# Patient Record
Sex: FEMALE | Race: WHITE | Employment: OTHER | URBAN - METROPOLITAN AREA
[De-identification: names, ages, dates, MRNs, and addresses within clinical notes are randomized per-mention and may not be internally consistent; named-entity substitution may affect disease eponyms.]

---

## 2020-07-30 DIAGNOSIS — Z11.59 ENCOUNTER FOR SCREENING FOR OTHER VIRAL DISEASES: Primary | ICD-10-CM

## 2020-08-12 ENCOUNTER — DOCUMENTATION ONLY (OUTPATIENT)
Dept: EDUCATION SERVICES | Facility: CLINIC | Age: 70
End: 2020-08-12

## 2020-08-13 ENCOUNTER — TRANSFERRED RECORDS (OUTPATIENT)
Dept: HEALTH INFORMATION MANAGEMENT | Facility: CLINIC | Age: 70
End: 2020-08-13

## 2020-08-13 LAB
CREAT SERPL-MCNC: 0.93 MG/DL (ref 0.5–1)
GFR SERPL CREATININE-BSD FRML MDRD: 63 ML/MIN/1.73M2
GLUCOSE SERPL-MCNC: 113 MG/DL (ref 70–100)
POTASSIUM SERPL-SCNC: 4.7 MEQ/L (ref 3.5–5.3)

## 2020-08-27 DIAGNOSIS — Z11.59 ENCOUNTER FOR SCREENING FOR OTHER VIRAL DISEASES: ICD-10-CM

## 2020-08-27 PROCEDURE — U0003 INFECTIOUS AGENT DETECTION BY NUCLEIC ACID (DNA OR RNA); SEVERE ACUTE RESPIRATORY SYNDROME CORONAVIRUS 2 (SARS-COV-2) (CORONAVIRUS DISEASE [COVID-19]), AMPLIFIED PROBE TECHNIQUE, MAKING USE OF HIGH THROUGHPUT TECHNOLOGIES AS DESCRIBED BY CMS-2020-01-R: HCPCS | Performed by: ORTHOPAEDIC SURGERY

## 2020-08-28 LAB
SARS-COV-2 RNA SPEC QL NAA+PROBE: NOT DETECTED
SPECIMEN SOURCE: NORMAL

## 2020-08-28 RX ORDER — ATENOLOL 25 MG/1
12.5-25 TABLET ORAL DAILY PRN
Status: ON HOLD | COMMUNITY
End: 2020-09-04

## 2020-08-28 RX ORDER — MULTIVITAMIN,THERAPEUTIC
1 TABLET ORAL EVERY MORNING
COMMUNITY
End: 2020-08-31

## 2020-08-28 RX ORDER — ASPIRIN 81 MG/1
81 TABLET ORAL
Status: ON HOLD | COMMUNITY
End: 2020-09-01

## 2020-08-28 RX ORDER — DIVALPROEX SODIUM 250 MG/1
250-500 TABLET, EXTENDED RELEASE ORAL
COMMUNITY

## 2020-08-28 RX ORDER — ATORVASTATIN CALCIUM 10 MG/1
10 TABLET, FILM COATED ORAL AT BEDTIME
COMMUNITY

## 2020-08-28 RX ORDER — ACETAMINOPHEN 500 MG
1000 TABLET ORAL 3 TIMES DAILY
COMMUNITY

## 2020-08-30 ENCOUNTER — ANESTHESIA EVENT (OUTPATIENT)
Dept: SURGERY | Facility: CLINIC | Age: 70
DRG: 469 | End: 2020-08-30
Payer: MEDICARE

## 2020-08-31 ENCOUNTER — ANESTHESIA (OUTPATIENT)
Dept: SURGERY | Facility: CLINIC | Age: 70
DRG: 469 | End: 2020-08-31
Payer: MEDICARE

## 2020-08-31 ENCOUNTER — APPOINTMENT (OUTPATIENT)
Dept: GENERAL RADIOLOGY | Facility: CLINIC | Age: 70
DRG: 469 | End: 2020-08-31
Attending: ORTHOPAEDIC SURGERY
Payer: MEDICARE

## 2020-08-31 ENCOUNTER — HOSPITAL ENCOUNTER (INPATIENT)
Facility: CLINIC | Age: 70
LOS: 4 days | Discharge: HOME | DRG: 469 | End: 2020-09-04
Attending: ORTHOPAEDIC SURGERY | Admitting: ORTHOPAEDIC SURGERY
Payer: MEDICARE

## 2020-08-31 DIAGNOSIS — Z96.642 STATUS POST TOTAL HIP REPLACEMENT, LEFT: ICD-10-CM

## 2020-08-31 DIAGNOSIS — Z96.642 STATUS POST TOTAL REPLACEMENT OF LEFT HIP: Primary | ICD-10-CM

## 2020-08-31 LAB
ABO + RH BLD: NORMAL
ABO + RH BLD: NORMAL
ANION GAP SERPL CALCULATED.3IONS-SCNC: 5 MMOL/L (ref 3–14)
ANION GAP SERPL CALCULATED.3IONS-SCNC: 5 MMOL/L (ref 3–14)
APTT PPP: 29 SEC (ref 22–37)
APTT PPP: 48 SEC (ref 22–37)
BLD GP AB SCN SERPL QL: NORMAL
BLD PROD TYP BPU: NORMAL
BLD UNIT ID BPU: 0
BLD UNIT ID BPU: 0
BLOOD BANK CMNT PATIENT-IMP: NORMAL
BLOOD PRODUCT CODE: NORMAL
BLOOD PRODUCT CODE: NORMAL
BPU ID: NORMAL
BPU ID: NORMAL
BUN SERPL-MCNC: 15 MG/DL (ref 7–30)
BUN SERPL-MCNC: 15 MG/DL (ref 7–30)
CALCIUM SERPL-MCNC: 7.1 MG/DL (ref 8.5–10.1)
CALCIUM SERPL-MCNC: 7.1 MG/DL (ref 8.5–10.1)
CHLORIDE SERPL-SCNC: 113 MMOL/L (ref 94–109)
CHLORIDE SERPL-SCNC: 113 MMOL/L (ref 94–109)
CO2 BLD-SCNC: 21 MMOL/L (ref 21–28)
CO2 BLD-SCNC: 24 MMOL/L (ref 21–28)
CO2 BLD-SCNC: 25 MMOL/L (ref 21–28)
CO2 SERPL-SCNC: 25 MMOL/L (ref 20–32)
CO2 SERPL-SCNC: 25 MMOL/L (ref 20–32)
CREAT SERPL-MCNC: 0.66 MG/DL (ref 0.52–1.04)
CREAT SERPL-MCNC: 0.71 MG/DL (ref 0.52–1.04)
ERYTHROCYTE [DISTWIDTH] IN BLOOD BY AUTOMATED COUNT: 12.4 % (ref 10–15)
GFR SERPL CREATININE-BSD FRML MDRD: 86 ML/MIN/{1.73_M2}
GFR SERPL CREATININE-BSD FRML MDRD: 90 ML/MIN/{1.73_M2}
GLUCOSE SERPL-MCNC: 191 MG/DL (ref 70–99)
GLUCOSE SERPL-MCNC: 201 MG/DL (ref 70–99)
HCT VFR BLD AUTO: 19.1 % (ref 35–47)
HCT VFR BLD AUTO: 30.3 % (ref 35–47)
HCT VFR BLD CALC: 25 %PCV (ref 35–47)
HCT VFR BLD CALC: 26 %PCV (ref 35–47)
HCT VFR BLD CALC: 27 %PCV (ref 35–47)
HGB BLD CALC-MCNC: 8.5 G/DL (ref 11.7–15.7)
HGB BLD CALC-MCNC: 8.8 G/DL (ref 11.7–15.7)
HGB BLD CALC-MCNC: 9.2 G/DL (ref 11.7–15.7)
HGB BLD-MCNC: 10.5 G/DL (ref 11.7–15.7)
HGB BLD-MCNC: 6.5 G/DL (ref 11.7–15.7)
INR PPP: 1.33 (ref 0.86–1.14)
MCH RBC QN AUTO: 31.6 PG (ref 26.5–33)
MCHC RBC AUTO-ENTMCNC: 34.7 G/DL (ref 31.5–36.5)
MCV RBC AUTO: 91 FL (ref 78–100)
NUM BPU REQUESTED: 4
PCO2 BLD: 32 MM HG (ref 35–45)
PCO2 BLD: 41 MM HG (ref 35–45)
PCO2 BLD: 44 MM HG (ref 35–45)
PH BLD: 7.35 PH (ref 7.35–7.45)
PH BLD: 7.38 PH (ref 7.35–7.45)
PH BLD: 7.43 PH (ref 7.35–7.45)
PLATELET # BLD AUTO: 150 10E9/L (ref 150–450)
PLATELET # BLD AUTO: 209 10E9/L (ref 150–450)
PO2 BLD: 231 MM HG (ref 80–105)
PO2 BLD: 369 MM HG (ref 80–105)
PO2 BLD: 436 MM HG (ref 80–105)
POTASSIUM BLD-SCNC: 2.9 MMOL/L (ref 3.4–5.3)
POTASSIUM BLD-SCNC: 3.6 MMOL/L (ref 3.4–5.3)
POTASSIUM BLD-SCNC: 3.8 MMOL/L (ref 3.4–5.3)
POTASSIUM SERPL-SCNC: 3.5 MMOL/L (ref 3.4–5.3)
POTASSIUM SERPL-SCNC: 3.6 MMOL/L (ref 3.4–5.3)
RBC # BLD AUTO: 3.32 10E12/L (ref 3.8–5.2)
SAO2 % BLDA FROM PO2: 100 % (ref 92–100)
SODIUM BLD-SCNC: 141 MMOL/L (ref 133–144)
SODIUM BLD-SCNC: 142 MMOL/L (ref 133–144)
SODIUM BLD-SCNC: 142 MMOL/L (ref 133–144)
SODIUM SERPL-SCNC: 143 MMOL/L (ref 133–144)
SODIUM SERPL-SCNC: 143 MMOL/L (ref 133–144)
SPECIMEN EXP DATE BLD: NORMAL
TRANSFUSION STATUS PATIENT QL: NORMAL
TROPONIN I SERPL-MCNC: 0.08 UG/L (ref 0–0.04)
WBC # BLD AUTO: 16.6 10E9/L (ref 4–11)

## 2020-08-31 PROCEDURE — 25000125 ZZHC RX 250: Performed by: ANESTHESIOLOGY

## 2020-08-31 PROCEDURE — 85014 HEMATOCRIT: CPT

## 2020-08-31 PROCEDURE — 99205 OFFICE O/P NEW HI 60 MIN: CPT | Performed by: PHYSICIAN ASSISTANT

## 2020-08-31 PROCEDURE — 84132 ASSAY OF SERUM POTASSIUM: CPT

## 2020-08-31 PROCEDURE — 25000125 ZZHC RX 250: Performed by: NURSE ANESTHETIST, CERTIFIED REGISTERED

## 2020-08-31 PROCEDURE — 86900 BLOOD TYPING SEROLOGIC ABO: CPT | Performed by: ANESTHESIOLOGY

## 2020-08-31 PROCEDURE — 36000065 ZZH SURGERY LEVEL 4 W FLUORO 1ST 30 MIN: Performed by: ORTHOPAEDIC SURGERY

## 2020-08-31 PROCEDURE — 25800025 ZZH RX 258: Performed by: ORTHOPAEDIC SURGERY

## 2020-08-31 PROCEDURE — 25800030 ZZH RX IP 258 OP 636: Performed by: ANESTHESIOLOGY

## 2020-08-31 PROCEDURE — 40000985 XR PELVIS AND HIP LEFT 1 VIEW

## 2020-08-31 PROCEDURE — 86850 RBC ANTIBODY SCREEN: CPT | Performed by: ANESTHESIOLOGY

## 2020-08-31 PROCEDURE — 85027 COMPLETE CBC AUTOMATED: CPT | Performed by: ANESTHESIOLOGY

## 2020-08-31 PROCEDURE — 99207 ZZC CDG-CODE CATEGORY CHANGED: CPT | Performed by: PHYSICIAN ASSISTANT

## 2020-08-31 PROCEDURE — 86901 BLOOD TYPING SEROLOGIC RH(D): CPT | Performed by: ANESTHESIOLOGY

## 2020-08-31 PROCEDURE — 25000132 ZZH RX MED GY IP 250 OP 250 PS 637: Mod: GY | Performed by: ORTHOPAEDIC SURGERY

## 2020-08-31 PROCEDURE — 40000170 ZZH STATISTIC PRE-PROCEDURE ASSESSMENT II: Performed by: ORTHOPAEDIC SURGERY

## 2020-08-31 PROCEDURE — 84484 ASSAY OF TROPONIN QUANT: CPT | Performed by: ANESTHESIOLOGY

## 2020-08-31 PROCEDURE — 85730 THROMBOPLASTIN TIME PARTIAL: CPT | Performed by: ANESTHESIOLOGY

## 2020-08-31 PROCEDURE — 84295 ASSAY OF SERUM SODIUM: CPT

## 2020-08-31 PROCEDURE — 36415 COLL VENOUS BLD VENIPUNCTURE: CPT | Performed by: ANESTHESIOLOGY

## 2020-08-31 PROCEDURE — 25000128 H RX IP 250 OP 636: Performed by: NURSE ANESTHETIST, CERTIFIED REGISTERED

## 2020-08-31 PROCEDURE — 85730 THROMBOPLASTIN TIME PARTIAL: CPT | Performed by: ORTHOPAEDIC SURGERY

## 2020-08-31 PROCEDURE — P9041 ALBUMIN (HUMAN),5%, 50ML: HCPCS | Performed by: ANESTHESIOLOGY

## 2020-08-31 PROCEDURE — C1776 JOINT DEVICE (IMPLANTABLE): HCPCS | Performed by: ORTHOPAEDIC SURGERY

## 2020-08-31 PROCEDURE — 40000277 XR SURGERY CARM FLUORO LESS THAN 5 MIN W STILLS

## 2020-08-31 PROCEDURE — 80048 BASIC METABOLIC PNL TOTAL CA: CPT | Performed by: ANESTHESIOLOGY

## 2020-08-31 PROCEDURE — 25000128 H RX IP 250 OP 636: Performed by: ORTHOPAEDIC SURGERY

## 2020-08-31 PROCEDURE — 25000132 ZZH RX MED GY IP 250 OP 250 PS 637: Mod: GY | Performed by: PHYSICIAN ASSISTANT

## 2020-08-31 PROCEDURE — 82803 BLOOD GASES ANY COMBINATION: CPT

## 2020-08-31 PROCEDURE — 80048 BASIC METABOLIC PNL TOTAL CA: CPT | Performed by: ORTHOPAEDIC SURGERY

## 2020-08-31 PROCEDURE — 37000009 ZZH ANESTHESIA TECHNICAL FEE, EACH ADDTL 15 MIN: Performed by: ORTHOPAEDIC SURGERY

## 2020-08-31 PROCEDURE — 27210794 ZZH OR GENERAL SUPPLY STERILE: Performed by: ORTHOPAEDIC SURGERY

## 2020-08-31 PROCEDURE — 0SRB03Z REPLACEMENT OF LEFT HIP JOINT WITH CERAMIC SYNTHETIC SUBSTITUTE, OPEN APPROACH: ICD-10-PCS | Performed by: ORTHOPAEDIC SURGERY

## 2020-08-31 PROCEDURE — 25800030 ZZH RX IP 258 OP 636: Performed by: ORTHOPAEDIC SURGERY

## 2020-08-31 PROCEDURE — 25000125 ZZHC RX 250: Performed by: ORTHOPAEDIC SURGERY

## 2020-08-31 PROCEDURE — 25000566 ZZH SEVOFLURANE, EA 15 MIN: Performed by: ORTHOPAEDIC SURGERY

## 2020-08-31 PROCEDURE — 85610 PROTHROMBIN TIME: CPT | Performed by: ANESTHESIOLOGY

## 2020-08-31 PROCEDURE — 85027 COMPLETE CBC AUTOMATED: CPT | Performed by: ORTHOPAEDIC SURGERY

## 2020-08-31 PROCEDURE — 25000128 H RX IP 250 OP 636: Performed by: ANESTHESIOLOGY

## 2020-08-31 PROCEDURE — 37000008 ZZH ANESTHESIA TECHNICAL FEE, 1ST 30 MIN: Performed by: ORTHOPAEDIC SURGERY

## 2020-08-31 PROCEDURE — 25800030 ZZH RX IP 258 OP 636: Performed by: NURSE ANESTHETIST, CERTIFIED REGISTERED

## 2020-08-31 PROCEDURE — 93010 ELECTROCARDIOGRAM REPORT: CPT | Performed by: INTERNAL MEDICINE

## 2020-08-31 PROCEDURE — 71000013 ZZH RECOVERY PHASE 1 LEVEL 1 EA ADDTL HR: Performed by: ORTHOPAEDIC SURGERY

## 2020-08-31 PROCEDURE — 86923 COMPATIBILITY TEST ELECTRIC: CPT | Performed by: ANESTHESIOLOGY

## 2020-08-31 PROCEDURE — P9041 ALBUMIN (HUMAN),5%, 50ML: HCPCS | Performed by: NURSE ANESTHETIST, CERTIFIED REGISTERED

## 2020-08-31 PROCEDURE — 71000012 ZZH RECOVERY PHASE 1 LEVEL 1 FIRST HR: Performed by: ORTHOPAEDIC SURGERY

## 2020-08-31 PROCEDURE — P9016 RBC LEUKOCYTES REDUCED: HCPCS | Performed by: ANESTHESIOLOGY

## 2020-08-31 PROCEDURE — 36000063 ZZH SURGERY LEVEL 4 EA 15 ADDTL MIN: Performed by: ORTHOPAEDIC SURGERY

## 2020-08-31 PROCEDURE — 93005 ELECTROCARDIOGRAM TRACING: CPT

## 2020-08-31 DEVICE — IMPLANTABLE DEVICE
Type: IMPLANTABLE DEVICE | Site: HIP | Status: FUNCTIONAL
Brand: G7 ACETABULAR LINER

## 2020-08-31 DEVICE — IMPLANTABLE DEVICE
Type: IMPLANTABLE DEVICE | Site: HIP | Status: FUNCTIONAL
Brand: G7® ACETABULAR SYSTEM

## 2020-08-31 DEVICE — IMPLANTABLE DEVICE
Type: IMPLANTABLE DEVICE | Site: HIP | Status: FUNCTIONAL
Brand: KINECTIV®

## 2020-08-31 DEVICE — IMP HEAD FEM ZIM BIOLOX DELTA CER 32MM +0 00-8775-032-02: Type: IMPLANTABLE DEVICE | Site: HIP | Status: FUNCTIONAL

## 2020-08-31 RX ORDER — CEFAZOLIN SODIUM 1 G/3ML
1 INJECTION, POWDER, FOR SOLUTION INTRAMUSCULAR; INTRAVENOUS EVERY 8 HOURS
Status: COMPLETED | OUTPATIENT
Start: 2020-08-31 | End: 2020-09-01

## 2020-08-31 RX ORDER — SODIUM CHLORIDE, SODIUM LACTATE, POTASSIUM CHLORIDE, CALCIUM CHLORIDE 600; 310; 30; 20 MG/100ML; MG/100ML; MG/100ML; MG/100ML
INJECTION, SOLUTION INTRAVENOUS CONTINUOUS
Status: DISCONTINUED | OUTPATIENT
Start: 2020-08-31 | End: 2020-08-31 | Stop reason: HOSPADM

## 2020-08-31 RX ORDER — MEPERIDINE HYDROCHLORIDE 25 MG/ML
12.5 INJECTION INTRAMUSCULAR; INTRAVENOUS; SUBCUTANEOUS EVERY 5 MIN PRN
Status: DISCONTINUED | OUTPATIENT
Start: 2020-08-31 | End: 2020-08-31 | Stop reason: HOSPADM

## 2020-08-31 RX ORDER — TRANEXAMIC ACID 650 MG/1
1950 TABLET ORAL ONCE
Status: COMPLETED | OUTPATIENT
Start: 2020-08-31 | End: 2020-08-31

## 2020-08-31 RX ORDER — GLYCOPYRROLATE 0.2 MG/ML
INJECTION, SOLUTION INTRAMUSCULAR; INTRAVENOUS PRN
Status: DISCONTINUED | OUTPATIENT
Start: 2020-08-31 | End: 2020-08-31

## 2020-08-31 RX ORDER — ALBUMIN HUMAN 5 %
INTRAVENOUS SOLUTION INTRAVENOUS PRN
Status: DISCONTINUED | OUTPATIENT
Start: 2020-08-31 | End: 2020-08-31

## 2020-08-31 RX ORDER — CALCIUM CARBONATE 500 MG/1
1000 TABLET, CHEWABLE ORAL 4 TIMES DAILY PRN
Status: DISCONTINUED | OUTPATIENT
Start: 2020-08-31 | End: 2020-09-04 | Stop reason: HOSPADM

## 2020-08-31 RX ORDER — CEFAZOLIN SODIUM 1 G/3ML
1 INJECTION, POWDER, FOR SOLUTION INTRAMUSCULAR; INTRAVENOUS SEE ADMIN INSTRUCTIONS
Status: DISCONTINUED | OUTPATIENT
Start: 2020-08-31 | End: 2020-08-31 | Stop reason: HOSPADM

## 2020-08-31 RX ORDER — CALCIUM CARBONATE 500(1250)
500 TABLET ORAL EVERY MORNING
Status: DISCONTINUED | OUTPATIENT
Start: 2020-09-01 | End: 2020-09-04 | Stop reason: HOSPADM

## 2020-08-31 RX ORDER — OXYCODONE HYDROCHLORIDE 5 MG/1
5-10 TABLET ORAL
Status: DISCONTINUED | OUTPATIENT
Start: 2020-08-31 | End: 2020-09-04 | Stop reason: HOSPADM

## 2020-08-31 RX ORDER — CEFAZOLIN SODIUM 2 G/100ML
2 INJECTION, SOLUTION INTRAVENOUS
Status: COMPLETED | OUTPATIENT
Start: 2020-08-31 | End: 2020-08-31

## 2020-08-31 RX ORDER — ONDANSETRON 2 MG/ML
4 INJECTION INTRAMUSCULAR; INTRAVENOUS EVERY 6 HOURS PRN
Status: DISCONTINUED | OUTPATIENT
Start: 2020-08-31 | End: 2020-09-01

## 2020-08-31 RX ORDER — NALOXONE HYDROCHLORIDE 0.4 MG/ML
.1-.4 INJECTION, SOLUTION INTRAMUSCULAR; INTRAVENOUS; SUBCUTANEOUS
Status: DISCONTINUED | OUTPATIENT
Start: 2020-08-31 | End: 2020-08-31

## 2020-08-31 RX ORDER — ONDANSETRON 2 MG/ML
4 INJECTION INTRAMUSCULAR; INTRAVENOUS EVERY 30 MIN PRN
Status: DISCONTINUED | OUTPATIENT
Start: 2020-08-31 | End: 2020-08-31 | Stop reason: HOSPADM

## 2020-08-31 RX ORDER — ACETAMINOPHEN 325 MG/1
975 TABLET ORAL EVERY 8 HOURS
Status: DISCONTINUED | OUTPATIENT
Start: 2020-08-31 | End: 2020-09-04 | Stop reason: HOSPADM

## 2020-08-31 RX ORDER — HYDROMORPHONE HYDROCHLORIDE 1 MG/ML
.3-.5 INJECTION, SOLUTION INTRAMUSCULAR; INTRAVENOUS; SUBCUTANEOUS EVERY 5 MIN PRN
Status: DISCONTINUED | OUTPATIENT
Start: 2020-08-31 | End: 2020-08-31 | Stop reason: HOSPADM

## 2020-08-31 RX ORDER — FENTANYL CITRATE 50 UG/ML
INJECTION, SOLUTION INTRAMUSCULAR; INTRAVENOUS PRN
Status: DISCONTINUED | OUTPATIENT
Start: 2020-08-31 | End: 2020-08-31

## 2020-08-31 RX ORDER — MULTIPLE VITAMINS W/ MINERALS TAB 9MG-400MCG
1 TAB ORAL EVERY MORNING
Status: DISCONTINUED | OUTPATIENT
Start: 2020-09-01 | End: 2020-09-04 | Stop reason: HOSPADM

## 2020-08-31 RX ORDER — NITROGLYCERIN 0.4 MG/1
0.4 TABLET SUBLINGUAL EVERY 5 MIN PRN
Status: DISCONTINUED | OUTPATIENT
Start: 2020-08-31 | End: 2020-09-02

## 2020-08-31 RX ORDER — LIDOCAINE 40 MG/G
CREAM TOPICAL
Status: DISCONTINUED | OUTPATIENT
Start: 2020-08-31 | End: 2020-08-31

## 2020-08-31 RX ORDER — ALBUTEROL SULFATE 0.83 MG/ML
2.5 SOLUTION RESPIRATORY (INHALATION) EVERY 4 HOURS PRN
Status: DISCONTINUED | OUTPATIENT
Start: 2020-08-31 | End: 2020-08-31 | Stop reason: HOSPADM

## 2020-08-31 RX ORDER — DEXAMETHASONE SODIUM PHOSPHATE 4 MG/ML
INJECTION, SOLUTION INTRA-ARTICULAR; INTRALESIONAL; INTRAMUSCULAR; INTRAVENOUS; SOFT TISSUE PRN
Status: DISCONTINUED | OUTPATIENT
Start: 2020-08-31 | End: 2020-08-31

## 2020-08-31 RX ORDER — NEOSTIGMINE METHYLSULFATE 1 MG/ML
VIAL (ML) INJECTION PRN
Status: DISCONTINUED | OUTPATIENT
Start: 2020-08-31 | End: 2020-08-31

## 2020-08-31 RX ORDER — DIVALPROEX SODIUM 250 MG/1
250 TABLET, EXTENDED RELEASE ORAL AT BEDTIME
Status: DISCONTINUED | OUTPATIENT
Start: 2020-08-31 | End: 2020-09-04 | Stop reason: HOSPADM

## 2020-08-31 RX ORDER — PROCHLORPERAZINE MALEATE 5 MG
5 TABLET ORAL EVERY 6 HOURS PRN
Status: DISCONTINUED | OUTPATIENT
Start: 2020-08-31 | End: 2020-09-04 | Stop reason: HOSPADM

## 2020-08-31 RX ORDER — ONDANSETRON 4 MG/1
4 TABLET, ORALLY DISINTEGRATING ORAL EVERY 30 MIN PRN
Status: DISCONTINUED | OUTPATIENT
Start: 2020-08-31 | End: 2020-08-31 | Stop reason: HOSPADM

## 2020-08-31 RX ORDER — LIDOCAINE HYDROCHLORIDE 20 MG/ML
INJECTION, SOLUTION INFILTRATION; PERINEURAL PRN
Status: DISCONTINUED | OUTPATIENT
Start: 2020-08-31 | End: 2020-08-31

## 2020-08-31 RX ORDER — SODIUM CHLORIDE 9 MG/ML
INJECTION, SOLUTION INTRAVENOUS CONTINUOUS PRN
Status: DISCONTINUED | OUTPATIENT
Start: 2020-08-31 | End: 2020-08-31

## 2020-08-31 RX ORDER — DIVALPROEX SODIUM 250 MG/1
250-500 TABLET, EXTENDED RELEASE ORAL
Status: DISCONTINUED | OUTPATIENT
Start: 2020-08-31 | End: 2020-08-31

## 2020-08-31 RX ORDER — PHENYLEPHRINE HCL IN 0.9% NACL 50MG/250ML
0.5-6 PLASTIC BAG, INJECTION (ML) INTRAVENOUS CONTINUOUS
Status: DISCONTINUED | OUTPATIENT
Start: 2020-08-31 | End: 2020-08-31 | Stop reason: HOSPADM

## 2020-08-31 RX ORDER — ATORVASTATIN CALCIUM 10 MG/1
10 TABLET, FILM COATED ORAL AT BEDTIME
Status: DISCONTINUED | OUTPATIENT
Start: 2020-08-31 | End: 2020-09-04 | Stop reason: HOSPADM

## 2020-08-31 RX ORDER — ONDANSETRON 4 MG/1
4 TABLET, ORALLY DISINTEGRATING ORAL EVERY 6 HOURS PRN
Status: DISCONTINUED | OUTPATIENT
Start: 2020-08-31 | End: 2020-09-01

## 2020-08-31 RX ORDER — FENTANYL CITRATE 50 UG/ML
25-50 INJECTION, SOLUTION INTRAMUSCULAR; INTRAVENOUS
Status: DISCONTINUED | OUTPATIENT
Start: 2020-08-31 | End: 2020-08-31 | Stop reason: HOSPADM

## 2020-08-31 RX ORDER — HYDROMORPHONE HYDROCHLORIDE 1 MG/ML
0.2 INJECTION, SOLUTION INTRAMUSCULAR; INTRAVENOUS; SUBCUTANEOUS EVERY 30 MIN PRN
Status: DISCONTINUED | OUTPATIENT
Start: 2020-08-31 | End: 2020-09-04 | Stop reason: HOSPADM

## 2020-08-31 RX ORDER — ALBUMIN, HUMAN INJ 5% 5 %
12.5 SOLUTION INTRAVENOUS ONCE
Status: COMPLETED | OUTPATIENT
Start: 2020-08-31 | End: 2020-08-31

## 2020-08-31 RX ORDER — EPHEDRINE SULFATE 50 MG/ML
INJECTION, SOLUTION INTRAMUSCULAR; INTRAVENOUS; SUBCUTANEOUS PRN
Status: DISCONTINUED | OUTPATIENT
Start: 2020-08-31 | End: 2020-08-31

## 2020-08-31 RX ORDER — LIDOCAINE 40 MG/G
CREAM TOPICAL
Status: DISCONTINUED | OUTPATIENT
Start: 2020-08-31 | End: 2020-09-04 | Stop reason: HOSPADM

## 2020-08-31 RX ORDER — OXYCODONE HCL 10 MG/1
10 TABLET, FILM COATED, EXTENDED RELEASE ORAL ONCE
Status: COMPLETED | OUTPATIENT
Start: 2020-08-31 | End: 2020-08-31

## 2020-08-31 RX ORDER — SODIUM CHLORIDE, SODIUM LACTATE, POTASSIUM CHLORIDE, CALCIUM CHLORIDE 600; 310; 30; 20 MG/100ML; MG/100ML; MG/100ML; MG/100ML
INJECTION, SOLUTION INTRAVENOUS CONTINUOUS PRN
Status: DISCONTINUED | OUTPATIENT
Start: 2020-08-31 | End: 2020-08-31

## 2020-08-31 RX ORDER — ONDANSETRON 2 MG/ML
INJECTION INTRAMUSCULAR; INTRAVENOUS PRN
Status: DISCONTINUED | OUTPATIENT
Start: 2020-08-31 | End: 2020-08-31

## 2020-08-31 RX ORDER — UREA 40 %
CREAM (GRAM) TOPICAL
COMMUNITY

## 2020-08-31 RX ORDER — PROPOFOL 10 MG/ML
INJECTION, EMULSION INTRAVENOUS PRN
Status: DISCONTINUED | OUTPATIENT
Start: 2020-08-31 | End: 2020-08-31

## 2020-08-31 RX ORDER — ATENOLOL 25 MG/1
12.5-25 TABLET ORAL DAILY PRN
Status: DISCONTINUED | OUTPATIENT
Start: 2020-09-01 | End: 2020-08-31

## 2020-08-31 RX ORDER — HYDROXYZINE HYDROCHLORIDE 25 MG/1
25 TABLET, FILM COATED ORAL EVERY 6 HOURS PRN
Status: DISCONTINUED | OUTPATIENT
Start: 2020-08-31 | End: 2020-09-04 | Stop reason: HOSPADM

## 2020-08-31 RX ORDER — NALOXONE HYDROCHLORIDE 0.4 MG/ML
.1-.4 INJECTION, SOLUTION INTRAMUSCULAR; INTRAVENOUS; SUBCUTANEOUS
Status: DISCONTINUED | OUTPATIENT
Start: 2020-08-31 | End: 2020-09-04 | Stop reason: HOSPADM

## 2020-08-31 RX ORDER — VANCOMYCIN HYDROCHLORIDE 1 G/20ML
INJECTION, POWDER, LYOPHILIZED, FOR SOLUTION INTRAVENOUS PRN
Status: DISCONTINUED | OUTPATIENT
Start: 2020-08-31 | End: 2020-08-31 | Stop reason: HOSPADM

## 2020-08-31 RX ORDER — MAGNESIUM HYDROXIDE 1200 MG/15ML
LIQUID ORAL PRN
Status: DISCONTINUED | OUTPATIENT
Start: 2020-08-31 | End: 2020-08-31 | Stop reason: HOSPADM

## 2020-08-31 RX ORDER — DEXTROSE MONOHYDRATE, SODIUM CHLORIDE, AND POTASSIUM CHLORIDE 50; 1.49; 4.5 G/1000ML; G/1000ML; G/1000ML
INJECTION, SOLUTION INTRAVENOUS CONTINUOUS
Status: DISCONTINUED | OUTPATIENT
Start: 2020-08-31 | End: 2020-09-04 | Stop reason: HOSPADM

## 2020-08-31 RX ADMIN — PHENYLEPHRINE HYDROCHLORIDE 100 MCG: 10 INJECTION INTRAVENOUS at 12:14

## 2020-08-31 RX ADMIN — LIDOCAINE HYDROCHLORIDE 40 MG: 20 INJECTION, SOLUTION INFILTRATION; PERINEURAL at 10:35

## 2020-08-31 RX ADMIN — PROPOFOL 150 MG: 10 INJECTION, EMULSION INTRAVENOUS at 10:35

## 2020-08-31 RX ADMIN — SODIUM CHLORIDE, POTASSIUM CHLORIDE, SODIUM LACTATE AND CALCIUM CHLORIDE: 600; 310; 30; 20 INJECTION, SOLUTION INTRAVENOUS at 12:46

## 2020-08-31 RX ADMIN — HYDROMORPHONE HYDROCHLORIDE 0.2 MG: 1 INJECTION, SOLUTION INTRAMUSCULAR; INTRAVENOUS; SUBCUTANEOUS at 15:05

## 2020-08-31 RX ADMIN — SODIUM CHLORIDE, POTASSIUM CHLORIDE, SODIUM LACTATE AND CALCIUM CHLORIDE: 600; 310; 30; 20 INJECTION, SOLUTION INTRAVENOUS at 09:44

## 2020-08-31 RX ADMIN — Medication 5 MG: at 10:46

## 2020-08-31 RX ADMIN — SODIUM CHLORIDE: 9 INJECTION, SOLUTION INTRAVENOUS at 12:07

## 2020-08-31 RX ADMIN — FENTANYL CITRATE 25 MCG: 50 INJECTION, SOLUTION INTRAMUSCULAR; INTRAVENOUS at 10:56

## 2020-08-31 RX ADMIN — POTASSIUM CHLORIDE, DEXTROSE MONOHYDRATE AND SODIUM CHLORIDE: 150; 5; 450 INJECTION, SOLUTION INTRAVENOUS at 17:26

## 2020-08-31 RX ADMIN — Medication 5 MG: at 10:54

## 2020-08-31 RX ADMIN — DIVALPROEX SODIUM 250 MG: 250 TABLET, FILM COATED, EXTENDED RELEASE ORAL at 20:56

## 2020-08-31 RX ADMIN — TRANEXAMIC ACID 1950 MG: 650 TABLET ORAL at 08:42

## 2020-08-31 RX ADMIN — Medication 0.4 MCG/KG/MIN: at 13:39

## 2020-08-31 RX ADMIN — HYDROMORPHONE HYDROCHLORIDE 0.5 MG: 1 INJECTION, SOLUTION INTRAMUSCULAR; INTRAVENOUS; SUBCUTANEOUS at 11:15

## 2020-08-31 RX ADMIN — CEFAZOLIN 1 G: 1 INJECTION, POWDER, FOR SOLUTION INTRAMUSCULAR; INTRAVENOUS at 20:59

## 2020-08-31 RX ADMIN — EPINEPHRINE 100 MCG: 1 INJECTION INTRAMUSCULAR; INTRAVENOUS; SUBCUTANEOUS at 11:43

## 2020-08-31 RX ADMIN — EPINEPHRINE 100 MCG: 1 INJECTION INTRAMUSCULAR; INTRAVENOUS; SUBCUTANEOUS at 11:50

## 2020-08-31 RX ADMIN — Medication 0.4 MCG/KG/MIN: at 14:32

## 2020-08-31 RX ADMIN — PHENYLEPHRINE HYDROCHLORIDE 100 MCG: 10 INJECTION INTRAVENOUS at 12:36

## 2020-08-31 RX ADMIN — SODIUM CHLORIDE: 9 INJECTION, SOLUTION INTRAVENOUS at 13:11

## 2020-08-31 RX ADMIN — OXYCODONE HYDROCHLORIDE 10 MG: 10 TABLET, FILM COATED, EXTENDED RELEASE ORAL at 08:46

## 2020-08-31 RX ADMIN — EPINEPHRINE 200 MCG: 1 INJECTION INTRAMUSCULAR; INTRAVENOUS; SUBCUTANEOUS at 11:41

## 2020-08-31 RX ADMIN — FENTANYL CITRATE 50 MCG: 50 INJECTION, SOLUTION INTRAMUSCULAR; INTRAVENOUS at 11:15

## 2020-08-31 RX ADMIN — NEOSTIGMINE METHYLSULFATE 3 MG: 1 INJECTION, SOLUTION INTRAVENOUS at 12:53

## 2020-08-31 RX ADMIN — EPINEPHRINE 100 MCG: 1 INJECTION INTRAMUSCULAR; INTRAVENOUS; SUBCUTANEOUS at 11:45

## 2020-08-31 RX ADMIN — MIDAZOLAM 1 MG: 1 INJECTION INTRAMUSCULAR; INTRAVENOUS at 10:24

## 2020-08-31 RX ADMIN — ONDANSETRON 4 MG: 2 INJECTION INTRAMUSCULAR; INTRAVENOUS at 17:21

## 2020-08-31 RX ADMIN — CEFAZOLIN SODIUM 1 G: 2 INJECTION, SOLUTION INTRAVENOUS at 12:43

## 2020-08-31 RX ADMIN — SODIUM CHLORIDE, POTASSIUM CHLORIDE, SODIUM LACTATE AND CALCIUM CHLORIDE: 600; 310; 30; 20 INJECTION, SOLUTION INTRAVENOUS at 12:48

## 2020-08-31 RX ADMIN — Medication 5 MG: at 10:51

## 2020-08-31 RX ADMIN — Medication 0.4 MCG/KG/MIN: at 14:30

## 2020-08-31 RX ADMIN — PROCHLORPERAZINE EDISYLATE 5 MG: 5 INJECTION INTRAMUSCULAR; INTRAVENOUS at 19:14

## 2020-08-31 RX ADMIN — ONDANSETRON 4 MG: 2 INJECTION INTRAMUSCULAR; INTRAVENOUS at 12:53

## 2020-08-31 RX ADMIN — Medication 500 ML: at 12:31

## 2020-08-31 RX ADMIN — PHENYLEPHRINE HYDROCHLORIDE 0.25 MCG/KG/MIN: 10 INJECTION INTRAVENOUS at 12:36

## 2020-08-31 RX ADMIN — OXYCODONE HYDROCHLORIDE 5 MG: 5 TABLET ORAL at 19:07

## 2020-08-31 RX ADMIN — PHENYLEPHRINE HYDROCHLORIDE 200 MCG: 10 INJECTION INTRAVENOUS at 13:22

## 2020-08-31 RX ADMIN — ALBUMIN HUMAN 12.5 G: 0.05 INJECTION, SOLUTION INTRAVENOUS at 14:52

## 2020-08-31 RX ADMIN — Medication 10 MG: at 11:39

## 2020-08-31 RX ADMIN — CEFAZOLIN SODIUM 2 G: 2 INJECTION, SOLUTION INTRAVENOUS at 10:43

## 2020-08-31 RX ADMIN — HYDROMORPHONE HYDROCHLORIDE 0.3 MG: 1 INJECTION, SOLUTION INTRAMUSCULAR; INTRAVENOUS; SUBCUTANEOUS at 14:05

## 2020-08-31 RX ADMIN — FENTANYL CITRATE 25 MCG: 50 INJECTION, SOLUTION INTRAMUSCULAR; INTRAVENOUS at 10:35

## 2020-08-31 RX ADMIN — ROCURONIUM BROMIDE 40 MG: 10 INJECTION INTRAVENOUS at 10:35

## 2020-08-31 RX ADMIN — ATORVASTATIN CALCIUM 10 MG: 10 TABLET, FILM COATED ORAL at 20:57

## 2020-08-31 RX ADMIN — PHENYLEPHRINE HYDROCHLORIDE 200 MCG: 10 INJECTION INTRAVENOUS at 12:16

## 2020-08-31 RX ADMIN — PHENYLEPHRINE HYDROCHLORIDE 400 MCG: 10 INJECTION INTRAVENOUS at 11:39

## 2020-08-31 RX ADMIN — FENTANYL CITRATE 50 MCG: 50 INJECTION, SOLUTION INTRAMUSCULAR; INTRAVENOUS at 11:01

## 2020-08-31 RX ADMIN — GLYCOPYRROLATE 0.6 MG: 0.2 INJECTION, SOLUTION INTRAMUSCULAR; INTRAVENOUS at 12:53

## 2020-08-31 RX ADMIN — ROCURONIUM BROMIDE 10 MG: 10 INJECTION INTRAVENOUS at 11:30

## 2020-08-31 RX ADMIN — DEXAMETHASONE SODIUM PHOSPHATE 4 MG: 4 INJECTION, SOLUTION INTRA-ARTICULAR; INTRALESIONAL; INTRAMUSCULAR; INTRAVENOUS; SOFT TISSUE at 10:47

## 2020-08-31 RX ADMIN — EPINEPHRINE 100 MCG: 1 INJECTION INTRAMUSCULAR; INTRAVENOUS; SUBCUTANEOUS at 11:47

## 2020-08-31 RX ADMIN — PHENYLEPHRINE HYDROCHLORIDE 100 MCG: 10 INJECTION INTRAVENOUS at 12:32

## 2020-08-31 ASSESSMENT — ACTIVITIES OF DAILY LIVING (ADL)
RETIRED_EATING: 0-->INDEPENDENT
COGNITION: 0 - NO COGNITION ISSUES REPORTED
DRESS: 0-->INDEPENDENT
TRANSFERRING: 0-->INDEPENDENT
BATHING: 0-->INDEPENDENT
RETIRED_COMMUNICATION: 0-->UNDERSTANDS/COMMUNICATES WITHOUT DIFFICULTY
SWALLOWING: 0-->SWALLOWS FOODS/LIQUIDS WITHOUT DIFFICULTY
TOILETING: 0-->INDEPENDENT
FALL_HISTORY_WITHIN_LAST_SIX_MONTHS: NO
AMBULATION: 0-->INDEPENDENT

## 2020-08-31 ASSESSMENT — MIFFLIN-ST. JEOR: SCORE: 1256.17

## 2020-08-31 ASSESSMENT — ENCOUNTER SYMPTOMS: ORTHOPNEA: 0

## 2020-08-31 NOTE — ANESTHESIA POSTPROCEDURE EVALUATION
Patient: Mary Narvaez    Procedure(s):  LEFT TOTAL HIP ARTHROPLASTY, DIRECT ANTERIOR APPROACH    Diagnosis:Osteoarthritis of left hip, unspecified osteoarthritis type [M16.12]  Diagnosis Additional Information: No value filed.    Anesthesia Type:  General    Note:  Anesthesia Post Evaluation    Last vitals:  Vitals:    08/31/20 1656 08/31/20 1731 08/31/20 1736   BP: 97/62 97/79    Pulse: 93 86 84   Resp: 10 12 8   Temp:      SpO2: 98% 100% 98%         Electronically Signed By: Olya Kirk MD  August 31, 2020  6:00 PM

## 2020-08-31 NOTE — ANESTHESIA PREPROCEDURE EVALUATION
Anesthesia Pre-Procedure Evaluation    Patient: Mary Narvaez   MRN: [id number] : 1950          Preoperative Diagnosis: Osteoarthritis of left hip, unspecified osteoarthritis type [M16.12]    Procedure(s):  LEFT TOTAL HIP ARTHROPLASTY, DIRECT ANTERIOR APPROACH    No past medical history on file.  No past surgical history on file.    Anesthesia Evaluation     . Pt has had prior anesthetic.            ROS/MED HX    ENT/Pulmonary: Comment: TMJ synd     (-) sleep apnea   Neurologic: Comment: CTS    (+)migraines,     Cardiovascular: Comment: WPW ablation , PVCs    (+) Dyslipidemia, ----. : . . . :. .      (-) BLAND, orthopnea/PND and syncope   METS/Exercise Tolerance:  >4 METS   Hematologic:         Musculoskeletal: Comment: Foot pain, scheduled for C 1-2 fusions. Surgeon performing fusion aware of GEA for NOLA today and states ok to proceed.  No neuro sx other than pain in neck with lateral movements.    (+) arthritis,  -       GI/Hepatic:     (+) GERD Asymptomatic on medication,       Renal/Genitourinary:         Endo:     (+) Obesity, .      Psychiatric:         Infectious Disease:         Malignancy:         Other: Comment: Corneal dystrophy                          Physical Exam      Airway   Mallampati: II  TM distance: >3 FB  Neck ROM: full    Dental   (+) caps    Cardiovascular   Rhythm and rate: regular      Pulmonary    breath sounds clear to auscultation            No results found for: WBC, HGB, HCT, PLT, CRP, SED, NA, POTASSIUM, CHLORIDE, CO2, BUN, CR, GLC, AZEEM, PHOS, MAG, ALBUMIN, PROTTOTAL, ALT, AST, GGT, ALKPHOS, BILITOTAL, BILIDIRECT, LIPASE, AMYLASE, GARY, PTT, INR, FIBR, TSH, T4, T3, HCG, HCGS, CKTOTAL, CKMB, TROPN    Preop Vitals  BP Readings from Last 3 Encounters:   No data found for BP    Pulse Readings from Last 3 Encounters:   No data found for Pulse      Resp Readings from Last 3 Encounters:   No data found for Resp    SpO2 Readings from Last 3 Encounters:   No data found for SpO2     "  Temp Readings from Last 1 Encounters:   No data found for Temp    Ht Readings from Last 1 Encounters:   07/05/12 1.6 m (5' 3\")      Wt Readings from Last 1 Encounters:   07/05/12 69.9 kg (154 lb)    Estimated body mass index is 27.28 kg/m  as calculated from the following:    Height as of 7/5/12: 1.6 m (5' 3\").    Weight as of 7/5/12: 69.9 kg (154 lb).     Allergies   Allergen Reactions     Adhesive Tape      Codeine      Social History     Tobacco Use     Smoking status: Never Smoker   Substance Use Topics     Alcohol use: Not on file     Prior to Admission medications    Medication Sig Start Date End Date Taking? Authorizing Provider   acetaminophen (TYLENOL) 500 MG tablet Take 1,000 mg by mouth 3 times daily    Yes Reported, Patient   aspirin 81 MG EC tablet Take 81 mg by mouth four times a week (Monday, Wednesday, Friday, Saturday)   Yes Reported, Patient   atenolol (TENORMIN) 25 MG tablet Take 12.5-25 mg by mouth daily as needed (for PVC's) (In the morning.)   Yes Reported, Patient   atorvastatin (LIPITOR) 10 MG tablet Take 10 mg by mouth At Bedtime    Yes Reported, Patient   calcium carbonate (OS-AZEEM) 1500 (600 Ca) MG tablet Take 600 mg by mouth every morning   Yes Reported, Patient   divalproex sodium extended-release (DEPAKOTE ER) 250 MG 24 hr tablet Take 250-500 mg by mouth nightly as needed (migraines)    Yes Reported, Patient   Multiple Vitamins-Minerals (VISION FORMULA PO) Take 1 tablet by mouth every morning   Yes Reported, Patient   Multiple Vitamins-Minerals (WOMENS 50+ MULTI VITAMIN/MIN) TABS Take 1 tablet by mouth every morning   Yes Reported, Patient   omeprazole (PRILOSEC) 20 MG capsule Take 1 capsule by mouth four times a week (as needed) on Sunday,Tuesday,Thursday, Saturday.   Yes Reported, Patient   Urea 40 % CREA Externally apply topically three times a week after showering.   Yes Reported, Patient     Current Facility-Administered Medications Ordered in Epic   Medication Dose Route " Frequency Last Rate Last Dose     ceFAZolin (ANCEF) 1 g vial to attach to  ml bag for ADULT or 50 ml bag for PEDS  1 g Intravenous See Admin Instructions         dexamethasone (DECADRON) injection    PRN   4 mg at 08/31/20 1047     ePHEDrine injection    PRN   5 mg at 08/31/20 1046     fentaNYL (PF) (SUBLIMAZE) injection    PRN   25 mcg at 08/31/20 1056     lactated ringers infusion   Intravenous Continuous 25 mL/hr at 08/31/20 0944       lidocaine 1 % 0.1-1 mL  0.1-1 mL Other Q1H PRN         lidocaine 2% injection (MDV)    PRN   40 mg at 08/31/20 1035     midazolam (VERSED) injection    PRN   1 mg at 08/31/20 1024     propofol (DIPRIVAN) injection 10 mg/mL vial    PRN   150 mg at 08/31/20 1035     rocuronium injection    PRN   40 mg at 08/31/20 1035     sodium chloride (PF) 0.9% PF flush 3 mL  3 mL Intracatheter q1 min prn         No current [facility]-ordered outpatient medications on file.       lactated ringers 25 mL/hr at 08/31/20 0944     No results for input(s): NA, POTASSIUM, CHLORIDE, CO2, ANIONGAP, GLC, BUN, CR, AZEEM in the last 21302 hours.  No results for input(s): WBC, HGB, PLT in the last 34092 hours.  Recent Labs   Lab Test 08/31/20  0843   ABO A   RH Pos     No results for input(s): TROPI in the last 87338 hours.  No results for input(s): PH, PCO2, PO2, HCO3 in the last 29687 hours.  No results for input(s): HCG in the last 68276 hours.  No results found for this or any previous visit (from the past 744 hour(s)).  No results found for this or any previous visit (from the past 4320 hour(s)).      RECENT LABS:         Anesthesia Plan      History & Physical Review      ASA Status:  2 .        Plan for General   PONV prophylaxis:  Ondansetron (or other 5HT-3) and Dexamethasone or Solumedrol  Additional equipment: Videolaryngoscope Propofol infusion         Postoperative Care      Consents  Anesthetic plan, risks, benefits and alternatives discussed with:  Patient..                 Olya Kirk MD

## 2020-08-31 NOTE — PLAN OF CARE
PT: Orders received and chart reviewed. Pt is POD # 0 L NOLA. Per discussion with RN, pt is not appropriate for PT evaluation this PM due to hypotension.

## 2020-08-31 NOTE — CONSULTS
Consult Date:  08/31/2020      PRIMARY CARE PHYSICIAN:  Dr. Iniguez.      REQUESTING PHYSICIAN:  Osiel Madrid MD      REASON FOR CONSULTATION:  Assistance with medical management following a left total hip arthroplasty and noted resuscitation due to rapid acute blood loss resulting in hypotension and hypoxia.      HISTORY OF PRESENT ILLNESS:  Mary Narvaez is a 69-year-old female with a past medical history significant for osteoarthritis, osteopenia, hyperlipidemia, GERD, migraines, history of Jadiel-Parkinson-White syndrome status post ablation and a history of PVCs, who underwent an elective left total hip arthroplasty, for which the Hospitalist Service has been consulted.      This procedure was performed earlier today under general endotracheal anesthesia with an estimated blood loss of 1.6 liters.  Intraoperatively, patient developed rapid abrupt blood loss and subsequent hypotension and hypoxia.  The patient received 2.4 liters of crystalloid, 1 unit of packed red blood cells, 500 mL of albumin and alpha-1 agonist as well as some pressor support with phenylephrine.  The patient responded well, with blood pressures normalizing.  During this episode, a hemoglobin was checked and found to have dropped to 6.5, following intervention, had improved to 10.5.  A TALHA was performed that showed decreased IV volume and normal wall motion, LVH, and noted PFO.  This also indicated euvolemia with treatment with albumin and crystalloid as well as packed red blood cells.  EKG was performed and, when compared to previous EKG, showed no change, and initial troponin was checked and found to be mildly elevated at 0.079.  Tryptase and plasma histamine were ordered and are currently in process.      I personally spoke with Dr. Kirk of Anesthesia as well as Dr. Madrid regarding intraoperative events and overall postoperative plans.      I evaluated the patient in her hospital room with her  present at bedside.  The patient  indicated that because of her pain in her hip and her neck, she has felt more run down and fatigued.  She also notes that she plans to undergo a C1-C2 neck fusion in about 6 weeks.  She has frequent migraines with aura, for which she takes Depakote every night.  We discussed her PVCs and previous Jadiel-Parkinson-White syndrome, status post ablation, for which she states she takes atenolol 12.5 mg every morning.  She has had ongoing left hip pain and neck pain.  She indicates that she does bruise easily, depending on if she has been taking Excedrin due to her migraines.  Otherwise, she offers no complaints.  Reviewed the events that occurred intraoperatively, and the patient indicated that Dr. Madrid and Dr. Kirk had also spoken with her.      PAST MEDICAL HISTORY:   1.  Osteoarthritis.   2.  Osteopenia.   3.  Hyperlipidemia.   4.  GERD.   5.  Migraine with aura.   6.  PVCs.   7.  History of Jadiel-Parkinson-White syndrome, status post ablation.      PAST SURGICAL HISTORY:   1.  Cholecystectomy.   2.  Left breast biopsy.   3.  Right great toe fusion x 2 with noted foot osteotomy.   4.  Right carpal tunnel release.   5.  Left hip labrum tear repair.   6.  Bilateral thumb trigger finger release.   7.  Left trigger finger release.      AZUZC-MD-GRAHX MEDICATIONS:    Prior to Admission Medications   Prescriptions Last Dose Informant Patient Reported? Taking?   Multiple Vitamins-Minerals (VISION FORMULA PO) 8/30/2020 at AM Self Yes Yes   Sig: Take 1 tablet by mouth every morning   Multiple Vitamins-Minerals (WOMENS 50+ MULTI VITAMIN/MIN) TABS 8/30/2020 at AM Self Yes Yes   Sig: Take 1 tablet by mouth every morning   Urea 40 % CREA 8/26/2020 at AM Self Yes Yes   Sig: Externally apply topically three times a week after showering.   acetaminophen (TYLENOL) 500 MG tablet 8/31/2020 at 0700 Self Yes Yes   Sig: Take 1,000 mg by mouth 3 times daily    aspirin 81 MG EC tablet 8/24/2020 at AM Self Yes Yes   Sig: Take 81 mg by  mouth four times a week (Monday, Wednesday, Friday, Saturday)   atenolol (TENORMIN) 25 MG tablet 8/31/2020 at 0700 Self Yes Yes   Sig: Take 12.5-25 mg by mouth daily as needed (for PVC's) (In the morning.)   atorvastatin (LIPITOR) 10 MG tablet 8/30/2020 at HS Self Yes Yes   Sig: Take 10 mg by mouth At Bedtime    calcium carbonate (OS-AZEEM) 1500 (600 Ca) MG tablet 8/30/2020 at AM Self Yes Yes   Sig: Take 600 mg by mouth every morning   divalproex sodium extended-release (DEPAKOTE ER) 250 MG 24 hr tablet 8/30/2020 at HS Self Yes Yes   Sig: Take 250-500 mg by mouth nightly as needed (migraines)    omeprazole (PRILOSEC) 20 MG capsule 8/31/2020 at 0700 Self Yes Yes   Sig: Take 1 capsule by mouth four times a week (as needed) on Sunday,Tuesday,Thursday, Saturday.      Facility-Administered Medications: None        ALLERGIES:    Allergies   Allergen Reactions     Adhesive Tape      Codeine        SOCIAL HISTORY:  The patient currently resides in a house in [place] with her .  She is a lifelong nonsmoker, denied alcohol use or street illicit drug use.  She does not currently utilize a cane or walker.      FAMILY HISTORY:  The patient is adopted, but indicated that she is aware that her mother had hypertension and severe osteoarthritis.      REVIEW OF SYSTEMS:  A 10-point review of systems was performed.  All pertinent positives are listed in the History of Present Illness, otherwise is negative.      PHYSICAL EXAMINATION:   VITAL SIGNS:  Temperature is 97.1 degrees Fahrenheit with a blood pressure of 97/79, heart rate of 86 beats per minute, respiratory rate of 12, O2 saturation 100% on 2 liters per nasal cannula.  The patient is denying any pain.   GENERAL:  The patient is awake, alert, cooperative, in no apparent distress, alert and oriented x 3.   HEENT:  Normocephalic, atraumatic.  Dry mucous membranes present.  No exudates noted in the posterior pharynx.  Uvula is midline.  Eyes:  Pupils are equal, round, reactive  to light.  Extraocular movements are intact.  Normal sclerae.   NECK:  Supple, normal range of motion, no tracheal deviation.  No cervical lymphadenopathy present.   CARDIOVASCULAR:  Regular rate and rhythm, no rubs, murmurs or gallops appreciated.   PULMONARY:  Lungs are clear to auscultation bilaterally, no wheezes, rhonchi or rales appreciated.   GASTROINTESTINAL:  Hypoactive bowel sounds.  Soft, nontender, nondistended.   GENITOURINARY:  Garcia catheter is in place with clear yellow urine present in the bag.   NEUROLOGIC:  Cranial nerves II-XII are grossly intact.  The patient demonstrates equal sensation, coordination and strength in the upper extremities bilaterally.  Deferred lower extremity assessment due to postoperative state, but patient has equal sensation in the lower extremities bilaterally and equal and even bilateral plantar and dorsal flexion.   EXTREMITIES:  No lower extremity edema noted bilaterally.  Calves are nontender to palpation.      ASSESSMENT AND PLAN:  Mary Narvaez is a 69-year-old female with a past medical history significant for osteoarthritis, osteopenia, hyperlipidemia, GERD, migraines, history of Jadiel-Parkinson-White syndrome status post ablation and a history of PVCs, who underwent an elective left total hip arthroplasty, for which the Hospitalist Service has been consulted.     1.  Acute blood loss with associated hypotension and hypoxia:  Patient had intraoperative rapid abrupt blood loss of 1.5-1.6 liters with development of hypotension and hypoxia.  The patient was treated with IV albumin, IV crystalloids, 1 unit packed red blood cells and pressor support with good results, as patient's blood pressures have normalized, currently 97/79.  A hemoglobin checked during initial assessment showed acute drop down to 6.5.  After transfusion of 1 unit of packed red blood cells, it has improved to 10.5.  EKG was performed and unchanged from previous preoperative EKG.  Mild troponin  elevation of 0.07 likely is secondary to demand, as EKG is unchanged.  A noted TALHA was performed that showed decrease IV volume, normal wall motion, LVH and PFO.  At this point, we will recommend continuation of IV fluid, close monitoring under IMC status.  Monitor on continuous telemetry.  We will hold izszl-qe-vzioc atenolol.  We will repeat lab studies including CBC with differential and basic metabolic panel in the morning.   2.  Mild troponin elevation:  Suspect this is secondary to demand following acute blood loss and hypotension.  The patient underwent EKG and TALHA, which were unremarkable.  We will monitor on continuous telemetry.   3.  Leukocytosis:  The patient's white blood cell count was noted to be 16.6 postoperatively.  Notably, the patient did receive 4 mg of IV dexamethasone.  I believe that this is secondary to IV steroids and stress demargination.  We will repeat a CBC in the morning.   4.  Hyperglycemia:  Patient has no history of diabetes.  Again, patient received 4 mg of IV dexamethasone, which is likely contributing to hyperglycemia.  Basic metabolic panel will be checked in the morning.   5.  Osteoarthritis with noted degenerative changes of the left hip, status post left total hip arthroplasty:  Postoperative day #0.  Orthopedic Service will be managing at this point.  We will defer analgesic management, DVT prophylaxis and PT, OT assessment to their service.  Would strongly encourage utilization of incentive spirometer.   6.  Hyperlipidemia:  Will resume ebxsh-tf-ceynb atorvastatin 10 mg daily.   7.  Gastroesophageal reflux disease:  The patient utilizes omeprazole, I believe daily, 20 mg. We will resume this medication, as she states that if she misses a dose, she does get significant heartburn.   8.  Migraines with aura:  Patient utilizes Depakote nightly.  We will resume this medication.   9.  Premature ventricular contractions with a history of Jadiel-Parkinson-White syndrome:  Patient is  status post ablation and utilizes daily atenolol 12.5 mg. We will hold this medication and monitor on continuous telemetry.      DEEP VENOUS THROMBOSIS PROPHYLAXIS:  Per Orthopedic Service, patient has been placed on PCDs and a full-dose aspirin.      CODE STATUS:  Discussed with the patient, and she elects to be full code.      DISPOSITION:  Ultimately up to Orthopedic Service.      The patient was discussed with Dr. Renetta Covarrubias, who agrees with the assessment and plan as stated above.  Dr. Covarrubias will evaluate the patient independently.      At this time, I would like to thank Dr. Madrid for consulting the Hospitalist Service.  We will continue to follow.         RENETTA COVARRUBIAS DO       As dictated by JANKI MERLOS PA-C            D: 2020   T: 2020   MT: YANET      Name:     ISAEL JOSEPH   MRN:      [id number]        Account:       [id number]   :      1950           Consult Date:  2020      Document: [id number]       cc: Natalia Iniguez MD

## 2020-08-31 NOTE — BRIEF OP NOTE
[facility]    Brief Operative Note    Pre-operative diagnosis: Osteoarthritis of left hip, unspecified osteoarthritis type [M16.12]  Post-operative diagnosis Same as pre-operative diagnosis    Procedure: Procedure(s):  LEFT TOTAL HIP ARTHROPLASTY, DIRECT ANTERIOR APPROACH  Surgeon: Surgeon(s) and Role:     * Osiel Madrid MD - Primary     * Tien Fritz PA-C - Assisting  Anesthesia: General   Estimated blood loss: 1500  Drains: None  Specimens: * No specimens in log *  Findings:   Advanced DJD left hip.  Complications: The patient suffered an episode of profound hypotension just after placement of the acetabular component.  Prior to placement of the acetabular component, she was bleeding fairly vigorously from her bone, but that resolved with placement of the component, and there was no other significant bleeding.  She recovered her blood pressure with fluid rescusitation and pressors.  She did recieve one unit of PRBCs. Her Hgb upon leaving the operating room was 9.2..  Implants:   Implant Name Type Inv. Item Serial No.  Lot No. LRB No. Used Action   IMP SHELL BIOM G7 ACETAB PPS LYLE HOLE 48MM [facility] [id number] Total Joint Component/Insert IMP SHELL BIOM G7 ACETAB PPS LYLE HOLE 48MM [facility] [id number]  [employer] [id number] Left 1 Implanted   [employer] BI[employer] G7 ACETABULAR LINER NEUTRAL 32MM    [employer] [id number] Left 1 Implanted   IMP STEM ZIM TAPER KINECTIV M/L  9 -009-00 Total Joint Component/Insert IMP STEM ZIM TAPER KINECTIV M/L  9 -009-00  [employer] [id number] Left 1 Implanted   IMP HEAD FEM ZIM BIOLOX DELTA CER 32MM +0 -032-02 Total Joint Component/Insert IMP HEAD FEM ZIM BIOLOX DELTA CER 32MM +0 -032-02  [employer] [id number] Left 1 Implanted   IMP FEMORAL NECK ZIM MOD TAPER KINECTIV S -003-00 Total Joint Component/Insert IMP FEMORAL NECK ZIM MOD TAPER KINECTIV S -003-00  [employer] [id number] Left 1 Implanted

## 2020-08-31 NOTE — OP NOTE
Procedure Date: 08/31/2020      PREOPERATIVE DIAGNOSIS:  Degenerative arthritis, left hip.      POSTOPERATIVE DIAGNOSIS:  Degenerative arthritis, left hip.      PROCEDURE PERFORMED:  Left direct anterior total hip arthroplasty.      SURGEON:  Dr. Osiel Madrid MD      FIRST ASSISTANT:  Tien Fritz PA-C      PROCEDURE IN DETAIL:  The patient was brought to the operating room, given a general anesthetic.  She was placed supine on the radiolucent operating table, and her left hip and left lower extremity were then prepped and draped in the usual sterile fashion.  We placed two #5 Ethibond sutures in her skin to hold her panus up out of the way during the procedure and these were removed at the end of the procedure.   An incision was made over the anterior aspect of the hip.  This was carried down through subcutaneous tissues down to the fascia.  The fascia was incised longitudinally and the interval between the tensor fascia reyna and sartorius was developed deep.  The rectus muscle was reflected medially, exposing the circumflex vessels, which were cauterized.  The hip capsule was then exposed.  We excised the anterior capsule, exposing the femoral head and neck.  We then made an osteotomy at the level of our preoperatively templated osteotomy level and another osteotomy was made just below the femoral head and the neck and head fragments were then removed.  There were relatively advanced degenerative changes, especially over the medial aspect of the femoral head and acetabulum, with complete loss of articular cartilage in this area, consistent with her previous imaging studies and her preoperative symptoms.  Attention was then turned to the acetabulum.  The acetabulum was reamed to 47 mm.  We decided to just touch the acetabulum with a 48 reamer because her bone quality was so good.  We then impacted a size 48 Biomet G7 cup into the acetabulum.  Prior to placement of the acetabular component, there was a  fair amount of blood emanating from the bone of the acetabulum.  Once the acetabular component was placed, this really decreased substantially, but it was right around this time that the patient's blood pressure dropped, along with her oxygen saturations.  We did place the liner within the acetabulum in order to decrease any more blood loss from the hip, and really at this point there was minimal, if any, blood loss throughout the rest of the procedure.      We did stop the procedure at that point, while she was resuscitated.  She underwent resuscitation with fluid and pressors along with a transfusion of one unit of PRBCs.  Her hemoglobin improved from 8.5 to 9.2.  The anesthesia team was able to improve her blood pressure and oxygenation.  At this point, we resumed the procedure.    Attention was then turned to the femoral component.  The piriformis was released, allowing excellent exposure of the opening in the femoral canal.  The canal was opened with a starter reamer and then lateralized with a lateralizing reamer, and then we sequentially broached the hip up to a size 9 M/L taper broach. This appeared to fit nicely.  A real size 9 Kinectiv stem was then impacted into the femur.  This had excellent fixation.  We then trialed the hip with various neck lengths and offsets and it appeared that an extended offset -8 neck gave the best fit.  A real extended offset -8 neck with a size 32 ceramic head were then impacted onto the stem.  The hip was relocated.  The hip was stable through a full range of motion.  Soft tissue tension appeared satisfactory.  The leg length and offset appeared to be restored.  The wound was then irrigated first with a dilute solution of Betadine, which was allowed to sit within the wound for 3 minutes and then was thoroughly irrigated from the wound with a liter of normal saline.  We sprinkled a gram of vancomycin powder into the wound.  The fascia was closed with a running #1 Vicryl  suture.  The skin was closed with 2-0 Vicryl subcutaneous sutures and a 3-0 Monocryl running subcuticular suture and Steri-Strips.  A sterile dressing was applied to the wound.  The patient was then awakened from anesthesia and transferred to postanesthesia recovery in satisfactory condition.        It should be noted that my assistant was necessary throughout the entire procedure to assist with retraction and positioning.         LUCIEN NEWTON MD             D: 2020   T: 2020   MT: ARGELIA      Name:     ISAEL JOSEPH   MRN:      [id number]        Account:        [id number]   :      1950           Procedure Date: 2020      Document: [id number]

## 2020-08-31 NOTE — PROGRESS NOTES
Medication History Completed by Medication Scribe  Admission medication history interview status for the (Not on file)  admission is complete. See EPIC admission navigator for prior to admission medications     Medication history sources: Patient, Surescripts and H&P  Medication history source reliability: Good  Adherence assessment: N/A Not Observed    Significant changes made to the medication list:  Patient taking meds differently than prescribed; See PTA entries for: atenolol and Patient reports no longer taking the following meds (med scribe removed from PTA med list): Relafen, Topiramate      Additional medication history information:   None    Medication reconciliation completed by provider prior to medication history? No    Time spent in this activity: 30 minutes      Prior to Admission medications    Medication Sig Last Dose Taking? Auth Provider   acetaminophen (TYLENOL) 500 MG tablet Take 1,000 mg by mouth 3 times daily  8/31/2020 at AM Yes Reported, Patient   aspirin 81 MG EC tablet Take 81 mg by mouth four times a week (Monday, Wednesday, Friday, Saturday) 8/24/2020 at AM Yes Reported, Patient   atenolol (TENORMIN) 25 MG tablet Take 12.5-25 mg by mouth daily as needed (for PVC's) (In the morning.) 25 mg on 8/31/2020 at AM Yes Reported, Patient   atorvastatin (LIPITOR) 10 MG tablet Take 10 mg by mouth At Bedtime  8/30/2020 at HS Yes Reported, Patient   calcium carbonate (OS-AZEEM) 1500 (600 Ca) MG tablet Take 600 mg by mouth every morning 8/30/2020 at AM Yes Reported, Patient   divalproex sodium extended-release (DEPAKOTE ER) 250 MG 24 hr tablet Take 250-500 mg by mouth nightly as needed (migraines)  250 mg on 8/30/2020 at HS Yes Reported, Patient   Multiple Vitamins-Minerals (VISION FORMULA PO) Take 1 tablet by mouth every morning 8/30/2020 at AM Yes Reported, Patient   Multiple Vitamins-Minerals (WOMENS 50+ MULTI VITAMIN/MIN) TABS Take 1 tablet by mouth every morning 8/30/2020 at AM Yes Reported, Patient    omeprazole (PRILOSEC) 20 MG capsule Take 1 capsule by mouth four times a week (as needed) on Sunday,Tuesday,Thursday, Saturday. 8/31/2020 at AM Yes Reported, Patient   Urea 40 % CREA Externally apply topically three times a week after showering. 8/26/2020 at AM Yes Reported, Patient

## 2020-08-31 NOTE — ANESTHESIA POSTPROCEDURE EVALUATION
Patient: Mary Narvaez    Procedure(s):  LEFT TOTAL HIP ARTHROPLASTY, DIRECT ANTERIOR APPROACH    Diagnosis:Osteoarthritis of left hip, unspecified osteoarthritis type [M16.12]  Diagnosis Additional Information: No value filed.    Anesthesia Type:  General    Note:  Anesthesia Post Evaluation    Patient location during evaluation: PACU  Patient participation: Able to fully participate in evaluation  Level of consciousness: awake  Pain management: adequate  Airway patency: patent  Cardiovascular status: acceptable  Respiratory status: acceptable  Hydration status: acceptable  PONV: controlled     Anesthetic complications: None    Comments: During surgery decrease in EtCO2, BP, SpO2, noted.  Phenylephrine given and I was called.  !00% O2, decrease anesthesia, Trendelenburg, IV fluid boluses, alpha 1 agonists given.  Alex Anthony and Chandni arrived to help. Ms Narvaez had rapid blood loss of about 1.5L during open femur.  A cath, second IV and TALHA placed.  BP cuff read significantly lower than A cath.  TALHA showed decrease IV volume, nl wall motion, LVH, PFO.  TALHA showed euvolemia with treatment with albumen and crystalloid then PRBCs. She still required alpha 1 agonists. EBL 1.6 L, 1 U PRBCs, 500 mls 5% albumen, 2.4 L crystalloid. Extubated WNL.  CNS baseline.  12 lead ECG showed baseline result.  Hb 10.5.  Troponin 0.079.  Tryptase and plasma histamine sent. Discussed with Dr Madrid and Ms Naravez. Initially required phenylephrine in PACU then was weaned off. Admit to IMC, discussed with Hospitalist-Carloz Carrington.         Last vitals:  Vitals:    08/31/20 1630 08/31/20 1656 08/31/20 1731   BP:  97/62 97/79   Pulse: 96 93 86   Resp: 21 10 12   Temp:      SpO2: 98% 98% 100%         Electronically Signed By: Olya Kirk MD  August 31, 2020  5:32 PM

## 2020-08-31 NOTE — ANESTHESIA CARE TRANSFER NOTE
Patient: Mary Narvaez    Procedure(s):  LEFT TOTAL HIP ARTHROPLASTY, DIRECT ANTERIOR APPROACH    Diagnosis: Osteoarthritis of left hip, unspecified osteoarthritis type [M16.12]  Diagnosis Additional Information: No value filed.    Anesthesia Type:   General     Note:  Airway :Face Mask  Patient transferred to:PACU  Comments: Extubated, good air exchange.  Blood pressure decreasing, zaida drip titrated to effect.  I stat performed.  Lab values within normal range. VSS to PACU. VSS Report to RNHandoff Report: Identifed the Patient, Identified the Reponsible Provider, Reviewed the pertinent medical history, Discussed the surgical course, Reviewed Intra-OP anesthesia mangement and issues during anesthesia, Set expectations for post-procedure period and Allowed opportunity for questions and acknowledgement of understanding      Vitals: (Last set prior to Anesthesia Care Transfer)    CRNA VITALS  8/31/2020 1317 - 8/31/2020 1347      8/31/2020             Resp Rate (set):  10                Electronically Signed By: CARLY Newell CRNA  August 31, 2020  1:47 PM

## 2020-08-31 NOTE — PLAN OF CARE
A/Ox4. Blood pressures still soft.  Patient states not feeling dizzy or lightheaded. Tolerating sips of clears and a few crackers. Reporting nausea, Zofran and compazine given with little improvement. Left hip dressing CDI. CMS intact. Garcia intact good output. IVF running. Continue to monitor closely.

## 2020-09-01 ENCOUNTER — APPOINTMENT (OUTPATIENT)
Dept: PHYSICAL THERAPY | Facility: CLINIC | Age: 70
DRG: 469 | End: 2020-09-01
Attending: ORTHOPAEDIC SURGERY
Payer: MEDICARE

## 2020-09-01 PROBLEM — I95.81 POSTOPERATIVE HYPOTENSION: Status: ACTIVE | Noted: 2020-09-01

## 2020-09-01 LAB
ANION GAP SERPL CALCULATED.3IONS-SCNC: 3 MMOL/L (ref 3–14)
BASOPHILS # BLD AUTO: 0 10E9/L (ref 0–0.2)
BASOPHILS NFR BLD AUTO: 0.1 %
BUN SERPL-MCNC: 9 MG/DL (ref 7–30)
CALCIUM SERPL-MCNC: 7.5 MG/DL (ref 8.5–10.1)
CHLORIDE SERPL-SCNC: 108 MMOL/L (ref 94–109)
CO2 SERPL-SCNC: 28 MMOL/L (ref 20–32)
CREAT SERPL-MCNC: 0.79 MG/DL (ref 0.52–1.04)
DIFFERENTIAL METHOD BLD: ABNORMAL
EOSINOPHIL # BLD AUTO: 0 10E9/L (ref 0–0.7)
EOSINOPHIL NFR BLD AUTO: 0.1 %
ERYTHROCYTE [DISTWIDTH] IN BLOOD BY AUTOMATED COUNT: 12.8 % (ref 10–15)
GFR SERPL CREATININE-BSD FRML MDRD: 76 ML/MIN/{1.73_M2}
GLUCOSE SERPL-MCNC: 145 MG/DL (ref 70–99)
HCT VFR BLD AUTO: 26.8 % (ref 35–47)
HGB BLD-MCNC: 9 G/DL (ref 11.7–15.7)
HGB BLD-MCNC: 9.2 G/DL (ref 11.7–15.7)
IMM GRANULOCYTES # BLD: 0 10E9/L (ref 0–0.4)
IMM GRANULOCYTES NFR BLD: 0.2 %
LYMPHOCYTES # BLD AUTO: 1.3 10E9/L (ref 0.8–5.3)
LYMPHOCYTES NFR BLD AUTO: 12.5 %
MCH RBC QN AUTO: 30.6 PG (ref 26.5–33)
MCHC RBC AUTO-ENTMCNC: 33.6 G/DL (ref 31.5–36.5)
MCV RBC AUTO: 91 FL (ref 78–100)
MONOCYTES # BLD AUTO: 1.1 10E9/L (ref 0–1.3)
MONOCYTES NFR BLD AUTO: 10.9 %
NEUTROPHILS # BLD AUTO: 7.7 10E9/L (ref 1.6–8.3)
NEUTROPHILS NFR BLD AUTO: 76.2 %
NRBC # BLD AUTO: 0 10*3/UL
NRBC BLD AUTO-RTO: 0 /100
PLATELET # BLD AUTO: 152 10E9/L (ref 150–450)
POTASSIUM SERPL-SCNC: 3.9 MMOL/L (ref 3.4–5.3)
RBC # BLD AUTO: 2.94 10E12/L (ref 3.8–5.2)
SODIUM SERPL-SCNC: 139 MMOL/L (ref 133–144)
WBC # BLD AUTO: 10.1 10E9/L (ref 4–11)

## 2020-09-01 PROCEDURE — 80048 BASIC METABOLIC PNL TOTAL CA: CPT | Performed by: PHYSICIAN ASSISTANT

## 2020-09-01 PROCEDURE — 99207 ZZC CDG-MDM COMPONENT: MEETS MODERATE - UP CODED: CPT | Performed by: PHYSICIAN ASSISTANT

## 2020-09-01 PROCEDURE — 25000132 ZZH RX MED GY IP 250 OP 250 PS 637: Mod: GY | Performed by: ORTHOPAEDIC SURGERY

## 2020-09-01 PROCEDURE — 99233 SBSQ HOSP IP/OBS HIGH 50: CPT | Performed by: PHYSICIAN ASSISTANT

## 2020-09-01 PROCEDURE — 12000047 ZZH R&B IMCU

## 2020-09-01 PROCEDURE — 85025 COMPLETE CBC W/AUTO DIFF WBC: CPT | Performed by: PHYSICIAN ASSISTANT

## 2020-09-01 PROCEDURE — 97530 THERAPEUTIC ACTIVITIES: CPT | Mod: GP | Performed by: PHYSICAL THERAPIST

## 2020-09-01 PROCEDURE — 25000132 ZZH RX MED GY IP 250 OP 250 PS 637: Mod: GY | Performed by: PHYSICIAN ASSISTANT

## 2020-09-01 PROCEDURE — 97110 THERAPEUTIC EXERCISES: CPT | Mod: GP | Performed by: PHYSICAL THERAPIST

## 2020-09-01 PROCEDURE — 36415 COLL VENOUS BLD VENIPUNCTURE: CPT | Performed by: PHYSICIAN ASSISTANT

## 2020-09-01 PROCEDURE — 97161 PT EVAL LOW COMPLEX 20 MIN: CPT | Mod: GP | Performed by: PHYSICAL THERAPIST

## 2020-09-01 PROCEDURE — 25000128 H RX IP 250 OP 636: Performed by: ORTHOPAEDIC SURGERY

## 2020-09-01 PROCEDURE — 25800030 ZZH RX IP 258 OP 636: Performed by: ORTHOPAEDIC SURGERY

## 2020-09-01 PROCEDURE — 85018 HEMOGLOBIN: CPT | Performed by: PHYSICIAN ASSISTANT

## 2020-09-01 RX ORDER — OXYCODONE HYDROCHLORIDE 5 MG/1
5-10 TABLET ORAL
Qty: 30 TABLET | Refills: 0 | Status: SHIPPED | OUTPATIENT
Start: 2020-09-01

## 2020-09-01 RX ORDER — AMOXICILLIN 250 MG
1-2 CAPSULE ORAL 2 TIMES DAILY
Qty: 30 TABLET | Refills: 0 | Status: SHIPPED | OUTPATIENT
Start: 2020-09-01

## 2020-09-01 RX ADMIN — HYDROXYZINE HYDROCHLORIDE 25 MG: 25 TABLET ORAL at 02:31

## 2020-09-01 RX ADMIN — ACETAMINOPHEN 975 MG: 325 TABLET, FILM COATED ORAL at 16:26

## 2020-09-01 RX ADMIN — CEFAZOLIN 1 G: 1 INJECTION, POWDER, FOR SOLUTION INTRAMUSCULAR; INTRAVENOUS at 05:14

## 2020-09-01 RX ADMIN — DIVALPROEX SODIUM 250 MG: 250 TABLET, FILM COATED, EXTENDED RELEASE ORAL at 21:59

## 2020-09-01 RX ADMIN — POTASSIUM CHLORIDE, DEXTROSE MONOHYDRATE AND SODIUM CHLORIDE: 150; 5; 450 INJECTION, SOLUTION INTRAVENOUS at 02:09

## 2020-09-01 RX ADMIN — OMEPRAZOLE 20 MG: 20 CAPSULE, DELAYED RELEASE ORAL at 10:13

## 2020-09-01 RX ADMIN — MULTIPLE VITAMINS W/ MINERALS TAB 1 TABLET: TAB at 08:22

## 2020-09-01 RX ADMIN — ACETAMINOPHEN 975 MG: 325 TABLET, FILM COATED ORAL at 08:51

## 2020-09-01 RX ADMIN — OXYCODONE HYDROCHLORIDE 5 MG: 5 TABLET ORAL at 10:12

## 2020-09-01 RX ADMIN — POTASSIUM CHLORIDE, DEXTROSE MONOHYDRATE AND SODIUM CHLORIDE: 150; 5; 450 INJECTION, SOLUTION INTRAVENOUS at 10:12

## 2020-09-01 RX ADMIN — ATORVASTATIN CALCIUM 10 MG: 10 TABLET, FILM COATED ORAL at 21:59

## 2020-09-01 RX ADMIN — ASPIRIN 325 MG: 325 TABLET, COATED ORAL at 08:22

## 2020-09-01 ASSESSMENT — ACTIVITIES OF DAILY LIVING (ADL)
ADLS_ACUITY_SCORE: 13

## 2020-09-01 NOTE — PROGRESS NOTES
09/01/20 0820   Quick Adds   Type of Visit Initial PT Evaluation   Living Environment   Lives With spouse   Living Arrangements house   Home Accessibility stairs to enter home;stairs within home   Number of Stairs, Main Entrance 2   Stair Railings, Main Entrance none   Number of Stairs, Within Home, Primary other (see comments)  (14 to 2nd floor bedroom)   Stair Railings, Within Home, Primary railings on both sides of stairs   Transportation Anticipated car, drives self;family or friend will provide   Living Environment Comment patient lives in a 2 story home with spouse   Self-Care   Usual Activity Tolerance good   Current Activity Tolerance poor   Equipment Currently Used at Home none  (has a cane)   Activity/Exercise/Self-Care Comment normally independent with mobility and cares   Functional Level Prior   Ambulation 0-->independent   Transferring 0-->independent   Toileting 0-->independent   Bathing 0-->independent   Communication 0-->understands/communicates without difficulty   Swallowing 0-->swallows foods/liquids without difficulty   Cognition 0 - no cognition issues reported   Fall history within last six months no   Which of the above functional risks had a recent onset or change? ambulation;transferring   Prior Functional Level Comment normally independent with mobility and cares   General Information   Onset of Illness/Injury or Date of Surgery - Date 08/31/20   Referring Physician Dr. Osiel Madrid   Patient/Family Goals Statement return home with spouse   Pertinent History of Current Problem (include personal factors and/or comorbidities that impact the POC) per chart:  Mary Narvaez is a 69-year-old female with a past medical history significant for osteoarthritis, osteopenia, hyperlipidemia, GERD, migraines, history of Jadiel-Parkinson-White syndrome status post ablation and a history of PVCs, who underwent an elective left total hip arthroplasty; had issues with Hgb drop, hypotension and hypoxia  and [place] [facility] [place]; see medical record for further information; awaiting a cervical fusion in a few weeks   Precautions/Limitations fall precautions;other (see comments)  (no hip precautions)   Weight-Bearing Status - LLE weight-bearing as tolerated   General Observations HR tachy; /59   General Info Comments Activity: ambulate with assist   Cognitive Status Examination   Orientation orientation to person, place and time   Level of Consciousness alert   Follows Commands and Answers Questions 100% of the time   Personal Safety and Judgment intact   Memory intact   Pain Assessment   Patient Currently in Pain Yes, see Vital Sign flowsheet  (3/10)   Integumentary/Edema   Integumentary/Edema Comments L hip incision; covered   Posture    Posture Comments WFL   Range of Motion (ROM)   ROM Comment L LE decreased from recent surgery and pain   Strength   Strength Comments functional weakness noted with mobility; limited by pain from recent surgery and further limited by hypotension   Bed Mobility   Bed Mobility Comments mod A for supine to sit   Transfer Skills   Transfer Comments currently unable to stand secondary to feeling faint   Gait   Gait Comments currently unable this am; limited by hypotension   Balance   Balance Comments intact sitting balance at EOB   Sensory Examination   Sensory Perception Comments intact   Modality Interventions   Planned Modality Interventions Comments ice to L hip   General Therapy Interventions   Planned Therapy Interventions bed mobility training;gait training;ROM;strengthening;transfer training;progressive activity/exercise   Clinical Impression   Criteria for Skilled Therapeutic Intervention yes, treatment indicated   PT Diagnosis impaired gait/transfers   Influenced by the following impairments hypotension; decreased Hgb; tachycardia; pain; symptomatic with OOB mobility; pain; decreased L hip ROM/strength   Functional limitations due to impairments impaired  "independence with functional mobility   Clinical Presentation Evolving/Changing   Clinical Presentation Rationale clincal judgement; level of assist   Clinical Decision Making (Complexity) Low complexity   Therapy Frequency 2x/day   Predicted Duration of Therapy Intervention (days/wks) 2-3 days   Anticipated Equipment Needs at Discharge front wheeled walker   Anticipated Discharge Disposition Other (see comments)  (defer to Ortho surgeon/PA)   Risk & Benefits of therapy have been explained Yes   Patient, Family & other staff in agreement with plan of care Yes   [facility] TM \"6 Clicks\"   2016, Trustees of [facility], under license to [employer].  All rights reserved.   6 Clicks Short Forms Basic Mobility Inpatient Short Form   [facility] AM-PAC  \"6 Clicks\" V.2 Basic Mobility Inpatient Short Form   1. Turning from your back to your side while in a flat bed without using bedrails? 3 - A Little   2. Moving from lying on your back to sitting on the side of a flat bed without using bedrails? 2 - A Lot   3. Moving to and from a bed to a chair (including a wheelchair)? 2 - A Lot   4. Standing up from a chair using your arms (e.g., wheelchair, or bedside chair)? 2 - A Lot   5. To walk in hospital room? 2 - A Lot   6. Climbing 3-5 steps with a railing? 2 - A Lot   Basic Mobility Raw Score (Score out of 24.Lower scores equate to lower levels of function) 13   Total Evaluation Time   Total Evaluation Time (Minutes) 10     "

## 2020-09-01 NOTE — PROGRESS NOTES
[facility]    Medicine History and Physical - Hospitalist Service       Date of Admission:  8/31/2020    Assessment & Plan   Mary Narvaez is a 69 year old female admitted on 8/31/2020. She is admitted under the care of orthopedics for postoperative management following an elective left total hip arthroplasty.  Hospitalist service is consulted for management following surgery complicated by intraoperative symptomatic acute blood loss anemia.    Acute hypotension and hypoxic respiratory failure secondary to acute blood loss.  Type II MI.  Abrupt loss 1.5 L blood with associated hypotension and hypoxia resuscitated with IV 500ml albumin, 2.4L crystalloids, pressors, and 1 unit PRBCs. Held ASA x 1 wk PTA.  Hgb nicki 6.5 and postoperatively following resuscitation 10.5.  Postop EKG unchanged.  Troponin elevation to 0.07 attributed to demand ischemia.  TTE consistent with volume depletion, showed normal wall motion, LVH, and incidental PFO.  -Continue tele.  -Continue hold PTA atenolol.  -Follow orthostatics every shift until negative.  -Continue  mL/h.  -Recheck Hgb at noon and if stable can discontinue IMC.    Prolonged QT interval. EKG measuring 499ms, preop EKG 405ms.  PVCs and Hx WPW s/p ablation.  -Holding beta-blocker as above.  -Discontinue Zofran.  -Tele as above.    Possible PFO.  Reported per anesthesia bedside echo eval.  -Needs formal TTE which can be done in OP setting.  -Incidentally not discussed with patient 9/1.    ADDENDUM: Discussed with attending, obtain TTE this adm given PFO and ongoing intm hypotension. Informed patient who informs has known PFO and this is why she takes ASA 81mg. Last TTE 2002 [facility].     Left hip OA status post left total hip arthroplasty via direct anterior approach (8/31/2020).  -Postop management per orthopedics.  -PTA aspirin 81 mg increased to 325 mg daily for DVT prophylaxis.    DLD.  -Continues on atorvastatin.    GERD.  PPI.    Migraines with  aura.  Continue Depakote nightly.    Diet: Regular Diet Adult    Garcia Catheter: in place, indication: Strict 1-2 Hour I&O    DVT Prophylaxis: Pneumatic Compression Devices  Code Status: Full Code    Expected discharge: Tomorrow, recommended to prior living arrangement once negative orthostatics and stable hgb.    The patient's care was discussed with the Attending Physician, Dr. Dutton, Bedside Nurse, Patient and Patient's Family.    JoAnna K. Barthell, PA-C  Hospitalist Service  [facility]    ______________________________________________________________________    Interval History   Pain controlled. No flatus yet, belching. No appetite. Hgb 9 this am and no further bleeding issues. Has awareness of fast heart beat. No cp, sob. Symptomatic orthostatic changes with PT this AM. BPs soft upper 90s, low 100s.    Data reviewed today: I reviewed all medications, new labs and imaging results over the last 24 hours. I personally reviewed the EKG tracing showing sinus with low voltage QRS and QTc 499ms.    Physical Exam   Vital Signs: Temp: 99.6  F (37.6  C) Temp src: Oral BP: 104/53 Pulse: 85   Resp: 17 SpO2: 97 % O2 Device: None (Room air) Oxygen Delivery: 2 LPM  Weight: 168 lbs 0 oz  Constitutional: Appears stated age, no acute distress.  Respiratory: Breath sounds CTA. No increased work of breathing on room air.  Cardiovascular: Tachycardic. No rub or murmur appreciated. No peripheral edema.  GI: Soft, non-tender, non-distended.  Skin: Warm, dry, no rashes or lesions.  MSK: Moves all 4 extremities. Left surgical dressing left in place and appears clean and dry.    Medications     dextrose 5% and 0.45% NaCl + KCl 20 mEq/L 125 mL/hr at 09/01/20 1012       acetaminophen  975 mg Oral Q8H     aspirin  325 mg Oral Daily     atorvastatin  10 mg Oral At Bedtime     calcium carbonate 500 mg (elemental)  500 mg Oral QAM     divalproex sodium extended-release  250 mg Oral At Bedtime     multivitamin w/minerals  1  tablet Oral QAM     omeprazole  20 mg Oral Once per day on Sun Tue Thu Sat     sodium chloride (PF)  3 mL Intracatheter Q8H       Data   Recent Labs   Lab 09/01/20  0622 08/31/20  1412 08/31/20  1330  08/31/20  1230  08/31/20  1200   WBC 10.1 16.6*  --   --   --   --   --    HGB 9.0* 10.5* 9.2*   < >  --    < > 6.5*   MCV 91 91  --   --   --   --   --     209  --   --   --   --  150   INR  --  1.33*  --   --   --   --   --     143 142   < > 143   < >  --    POTASSIUM 3.9 3.5 3.8   < > 3.6   < >  --    CHLORIDE 108 113*  --   --  113*  --   --    CO2 28 25  --   --  25  --   --    BUN 9 15  --   --  15  --   --    CR 0.79 0.66  --   --  0.71  --   --    ANIONGAP 3 5  --   --  5  --   --    AZEEM 7.5* 7.1*  --   --  7.1*  --   --    * 201*  --   --  191*  --   --    TROPI  --  0.079*  --   --   --   --   --     < > = values in this interval not displayed.       Imaging:  Recent Results (from the past 24 hour(s))   XR Surgery HETAL Fluoro L/T 5 Min w Stills    Narrative    XR SURGERY C-ARM FLUOROSCOPY LESS THAN FIVE MINUTES WITH STILLS    8/31/2020 12:35 PM     HISTORY: Left direct anterior hip arthroplasty.     Fluoroscopy time 0.6, 1 image, C-arm 2    COMPARISON: None.      Impression    IMPRESSION: Single spot fluoroscopic image demonstrates total hip  arthroplasty. No periprosthetic fracture. Total fluoroscopic time is  0.6 minutes.    STACI PERDUE MD   XR Pelvis w Hip Left 1 View    Narrative    XR PELVIS AND LEFT HIP ONE VIEW   8/31/2020 3:07 PM     HISTORY:  Postoperative hip arthroplasty    Comparison: None.      Impression    IMPRESSION: There is a new left total hip arthroplasty. Excellent  position/alignment. No evidence of fracture or complication. Moderate  osteoarthrosis in the right hip.    ALCIRA TRUJILLO MD

## 2020-09-01 NOTE — PLAN OF CARE
A/Ox4. Low grade temp 99.2. BP's at rest improving. Patient is tachy at times.  Attempted to get patient out of bed twice. Orthostatic blood pressures still dropping very low into the 70's when sitting at edge of bed. Patient became very pale, light headed and nauseous. Hgb's trending up 9.0 now 9.2. left hip dressing CDI. CMS intact. Pain managed with oxycodone once, and tylenol. Garcia in place with good output. Tolerating regular diet with fair appetite. Possibly home tomorrow pending negative orthostatic BP's. IMC discontinued this evening. Continue to monitor.

## 2020-09-01 NOTE — PROGRESS NOTES
"POD# 1    SUBJECTIVE  Minimal pain at rest    OBJECTIVE:   /56   Pulse 102   Temp 98.1  F (36.7  C) (Oral)   Resp 12   Ht 1.6 m (5' 3\")   Wt 76.2 kg (168 lb)   SpO2 96%   BMI 29.76 kg/m     Hemoglobin   Date Value Ref Range Status   09/01/2020 9.0 (L) 11.7 - 15.7 g/dL Final   ]   Wound: dressing dry  Minimal swelling  Good UOP, over 1200 ml overnight    ASSESSMENT   Overall doing reasonably well  Has tachycardia this am, sinus rhythm on telemetry  Does not appear hypovolemic  Hgb dropped from 10.5 postop to 9.0 this morning, but this is not an unusual drop the first night after a NOLA, does not appear to be actively bleeding    PLAN   Change admit to \"inpatient\" for close monitoring of blood pressure and pulse  Keep Garcia in for now for better monitoring of fluid status  Recheck Hgb at noon   Okay to mobilize in PT      Osiel Madrid MD   "

## 2020-09-01 NOTE — PLAN OF CARE
Patient plan: return home with assist of spouse  Current status: PT: Order received; Initial evaluation completed and treatment initiated as able; Patient POD #1 s/p elective L NOLA; Patient currently on IMC status secondary to blood loss, hypotension, and hypoxia. No hip precautions; At baseline patient lives in a 2 story home with steps to bedroom level; no use of an assistive device; has a cane. No recent falls; On eval patient reports prior independence with mobility; having 3/10 pain; spouse and nurse present for session;patient able to do a few ex's prior to OOB; mod A for supine to sit bed mobility; BP prior to activity 105/58 and HR tachy at 114; after sitting at EOB x 3-4 minutes patient started to feel nauseated, sweaty, dizzy, and faint; became pale; max A x 2 to return to supine; BP 74/50; after 3 minutes BP returned to 99/61 and HR 93; O2 sats 98% on room air; Positioned legs with pillows for comfort; nurse attending to patient at end of session. Educated patient and spouse on what to expect in upcoming sessions; DME placed for walker for discharge. Would benefit from an OT consult for Post op L NOLA  Anticipated status at discharge: Anticipate when patient medically stable she will be independent with bed mobility and SBA for transfers/gait/stairs with use of a walker

## 2020-09-01 NOTE — PLAN OF CARE
SBPs 90's-100, HR 90's-low 100's, afebrile.  Good UOP per clark.  Hgb pending.  Moderate pain, oxy and atarax, used sparingly.  Repo as needed.  Dressing CDI, CMS intact.  Has remained bedrest overnight, attempt to dangle this am and remove clark if appropriate.  Nausea has mostly resolved.

## 2020-09-02 ENCOUNTER — APPOINTMENT (OUTPATIENT)
Dept: CARDIOLOGY | Facility: CLINIC | Age: 70
DRG: 469 | End: 2020-09-02
Attending: PHYSICIAN ASSISTANT
Payer: MEDICARE

## 2020-09-02 ENCOUNTER — APPOINTMENT (OUTPATIENT)
Dept: PHYSICAL THERAPY | Facility: CLINIC | Age: 70
DRG: 469 | End: 2020-09-02
Attending: ORTHOPAEDIC SURGERY
Payer: MEDICARE

## 2020-09-02 LAB — HGB BLD-MCNC: 9.1 G/DL (ref 11.7–15.7)

## 2020-09-02 PROCEDURE — 25000132 ZZH RX MED GY IP 250 OP 250 PS 637: Mod: GY | Performed by: PHYSICIAN ASSISTANT

## 2020-09-02 PROCEDURE — 93306 TTE W/DOPPLER COMPLETE: CPT | Mod: 26 | Performed by: INTERNAL MEDICINE

## 2020-09-02 PROCEDURE — 85018 HEMOGLOBIN: CPT | Performed by: ORTHOPAEDIC SURGERY

## 2020-09-02 PROCEDURE — 93306 TTE W/DOPPLER COMPLETE: CPT

## 2020-09-02 PROCEDURE — 99232 SBSQ HOSP IP/OBS MODERATE 35: CPT | Performed by: PHYSICIAN ASSISTANT

## 2020-09-02 PROCEDURE — 25500064 ZZH RX 255 OP 636: Performed by: ORTHOPAEDIC SURGERY

## 2020-09-02 PROCEDURE — 12000000 ZZH R&B MED SURG/OB

## 2020-09-02 PROCEDURE — 36415 COLL VENOUS BLD VENIPUNCTURE: CPT | Performed by: ORTHOPAEDIC SURGERY

## 2020-09-02 PROCEDURE — 25000132 ZZH RX MED GY IP 250 OP 250 PS 637: Mod: GY | Performed by: ORTHOPAEDIC SURGERY

## 2020-09-02 PROCEDURE — 97530 THERAPEUTIC ACTIVITIES: CPT | Mod: GP

## 2020-09-02 PROCEDURE — 99207 ZZC CDG-MDM COMPONENT: MEETS LOW - DOWN CODED: CPT | Performed by: PHYSICIAN ASSISTANT

## 2020-09-02 PROCEDURE — 25800030 ZZH RX IP 258 OP 636: Performed by: ORTHOPAEDIC SURGERY

## 2020-09-02 PROCEDURE — 97110 THERAPEUTIC EXERCISES: CPT | Mod: GP

## 2020-09-02 RX ADMIN — CALCIUM 500 MG: 500 TABLET ORAL at 11:10

## 2020-09-02 RX ADMIN — ASPIRIN 325 MG: 325 TABLET, COATED ORAL at 08:52

## 2020-09-02 RX ADMIN — POTASSIUM CHLORIDE, DEXTROSE MONOHYDRATE AND SODIUM CHLORIDE: 150; 5; 450 INJECTION, SOLUTION INTRAVENOUS at 10:32

## 2020-09-02 RX ADMIN — ATORVASTATIN CALCIUM 10 MG: 10 TABLET, FILM COATED ORAL at 21:29

## 2020-09-02 RX ADMIN — POTASSIUM CHLORIDE, DEXTROSE MONOHYDRATE AND SODIUM CHLORIDE: 150; 5; 450 INJECTION, SOLUTION INTRAVENOUS at 02:32

## 2020-09-02 RX ADMIN — HUMAN ALBUMIN MICROSPHERES AND PERFLUTREN 3 ML: 10; .22 INJECTION, SOLUTION INTRAVENOUS at 11:02

## 2020-09-02 RX ADMIN — MULTIPLE VITAMINS W/ MINERALS TAB 1 TABLET: TAB at 08:53

## 2020-09-02 RX ADMIN — DIVALPROEX SODIUM 250 MG: 250 TABLET, FILM COATED, EXTENDED RELEASE ORAL at 21:29

## 2020-09-02 RX ADMIN — Medication 1 LOZENGE: at 05:00

## 2020-09-02 RX ADMIN — ACETAMINOPHEN 975 MG: 325 TABLET, FILM COATED ORAL at 08:53

## 2020-09-02 RX ADMIN — ACETAMINOPHEN 975 MG: 325 TABLET, FILM COATED ORAL at 02:28

## 2020-09-02 RX ADMIN — ACETAMINOPHEN 975 MG: 325 TABLET, FILM COATED ORAL at 17:25

## 2020-09-02 ASSESSMENT — MIFFLIN-ST. JEOR: SCORE: 1257.13

## 2020-09-02 ASSESSMENT — ACTIVITIES OF DAILY LIVING (ADL)
ADLS_ACUITY_SCORE: 13
ADLS_ACUITY_SCORE: 15
ADLS_ACUITY_SCORE: 13
ADLS_ACUITY_SCORE: 15
ADLS_ACUITY_SCORE: 13
ADLS_ACUITY_SCORE: 15

## 2020-09-02 NOTE — PROGRESS NOTES
A&O x4. VSS except low grade temp. IS encouraged and scheduled Acetaminophen given. Rates pain at goal rate of 1. States she is comfortable. Dressing changed, CDI. Report given to RN. Pt transferred to station 55.

## 2020-09-02 NOTE — PLAN OF CARE
Patient plan: return home with assist of spouse  Current status: Pt supine upon arrival, agreeable to PT, spouse present for session and supportive. Engaged in supine NOLA exercises, tolerated well. Supine>sit with Alexis for L LE, HOB elevated & use of rail. Pt reports slight lightheadedness, unchanged with time sitting, BP increased from supine (see VSFS). Sit>stand with FWW and CGA, pt reports increased pain along incision/burning sensation, holding gown off skin due to sensitivity/pain. After sitting rest, pt able to stand ~1 min for BP, then needing to sit due to symptoms of lightheadeness/nausea. Increased symptoms with time sitting, with BP 81/49. Returned to supine with ModA. Symptoms reduced & BP recovered to 122/69 with time supine. RN notified. Echo in room upon departure.    Anticipated status at discharge: Anticipate when patient medically stable & negative orthostatics she will be Alexis for L LE with bed mobility and SBA for transfers/gait with use of a walker, CGA with stairs & railing. Walker order has been submitted.      PM session: Able to stand and take steps to turn and sit at recliner with FWW and CGA, BP stable but reduced time standing, pt did report lightheadedness post transfer, resolved with seated rest at recliner with LEs elevated. Supine /68 , seated BP post transfer 122/73

## 2020-09-02 NOTE — PROGRESS NOTES
[facility]    Medicine Progress Note - Hospitalist Service       Date of Admission:  8/31/2020    Assessment & Plan    Mary Narvaez is a 69 year old female admitted on 8/31/2020. She is admitted under the care of orthopedics for postoperative management following an elective left total hip arthroplasty.  Hospitalist service is consulted for management following surgery complicated by intraoperative symptomatic acute blood loss anemia.    Acute hypotension and hypoxic respiratory failure secondary to acute blood loss.  Anemia, ABL.  Type II MI.  Abrupt loss 1.5 L blood with associated hypotension and hypoxia resuscitated with IV 500ml albumin, 2.4L crystalloids, pressors, and 1 unit PRBCs. Held ASA x 1 wk PTA.  Hgb nicki 6.5 and postoperatively following resuscitation 10.5.  Postop EKG unchanged.  Troponin elevation to 0.07 attributed to demand ischemia.  TTE consistent with volume depletion, showed normal wall motion, LVH, and incidental PFO. Hgb has normalized in 9 range.  -Continue tele.  -Continue hold PTA atenolol.  -Follow orthostatics every shift, including HR  -Continue  mL/h.    Prolonged QT interval. EKG measuring 499ms, preop EKG 405ms.  PVCs and Hx WPW s/p ablation.  -Holding beta-blocker as above.  -Discontinue Zofran.  -Tele as above.    PFO.  Reported per anesthesia bedside echo eval. Per pt, has known PFO.  -Continue PTA ASA 81mg daily following full-dose in postop phase     Left hip OA status post left total hip arthroplasty via direct anterior approach (8/31/2020).  -Postop management per orthopedics.  -PTA aspirin 81 mg increased to 325 mg daily for DVT prophylaxis.    DLD.  -Continues on atorvastatin.    GERD.  PPI.    Migraines with aura.  Continue Depakote nightly.     Diet: Regular Diet Adult  Diet  Snacks/Supplements Adult: Boost Shake; Between Meals    DVT Prophylaxis: Defer to primary service  Garcia Catheter: in place, indication: Strict 1-2 Hour I&O  Code  Status: Full Code           Disposition Plan   Expected discharge: 1-2 days, recommended to prior living arrangement once adequate pain management/ tolerating PO medications and orthostasis resolved, cleared by primary service.    OK from hospitalist perspective to transfer to ; charge RN updated.    Entered: Sachin William PA-C 09/02/2020, 9:51 AM       The patient's care was discussed with the Attending Physician, Dr. Dutton, Bedside Nurse, Patient and Patient's Family.    Sachin William PA-C  Hospitalist Service  [facility]    ______________________________________________________________________    Interval History   Pt seen & evaluated. Looking forward to attempting physical therapy today, hopeful to be able to start moving joint and ambulating. Discussed possible discharge, recommended having tomorrow as earliest goal given events. No lightheadedness/dizziness. No cp/sob. Eating well this morning, yesterday with minimal intake. Pain controlled.    Data reviewed today: I reviewed all medications, new labs and imaging results over the last 24 hours. I personally reviewed no images or EKG's today.    Physical Exam   Vital Signs: Temp: 98.1  F (36.7  C) Temp src: Oral BP: 125/70 Pulse: 96   Resp: 16 SpO2: 99 % O2 Device: None (Room air)    Weight: 168 lbs 3.38 oz  GEN: well-developed, well-nourished, appears comfortable  HEENT: NCAT, EOM intact bilaterally, nose & mouth patent, mucous membranes moist  CHEST: lungs CTA bilaterally, no increased work of breathing, no wheeze, crackles, rhonchi  HEART: RRR, S1 & S2, no murmur  ABD: soft, nontender, nondistended, no guarding or rigidity, hypoactive BS in all 4 quadrants  MSK: AROM bilateral UE, pedal & radial pulses 2+ bilaterally  NEURO: awake, alert, oriented to name, place, and time. CN II-XII grossly intact. Sensation grossly intact to light touch.   SKIN: warm & dry without rash, no pedal edema    Data   Recent Labs   Lab 09/02/20  0843  09/01/20  1140 09/01/20  0622 08/31/20  1412 08/31/20  1330  08/31/20  1230  08/31/20  1200   WBC  --   --  10.1 16.6*  --   --   --   --   --    HGB 9.1* 9.2* 9.0* 10.5* 9.2*   < >  --    < > 6.5*   MCV  --   --  91 91  --   --   --   --   --    PLT  --   --  152 209  --   --   --   --  150   INR  --   --   --  1.33*  --   --   --   --   --    NA  --   --  139 143 142   < > 143   < >  --    POTASSIUM  --   --  3.9 3.5 3.8   < > 3.6   < >  --    CHLORIDE  --   --  108 113*  --   --  113*  --   --    CO2  --   --  28 25  --   --  25  --   --    BUN  --   --  9 15  --   --  15  --   --    CR  --   --  0.79 0.66  --   --  0.71  --   --    ANIONGAP  --   --  3 5  --   --  5  --   --    AZEEM  --   --  7.5* 7.1*  --   --  7.1*  --   --    GLC  --   --  145* 201*  --   --  191*  --   --    TROPI  --   --   --  0.079*  --   --   --   --   --     < > = values in this interval not displayed.     No results found for this or any previous visit (from the past 24 hour(s)).  Medications     dextrose 5% and 0.45% NaCl + KCl 20 mEq/L 125 mL/hr at 09/02/20 0859       acetaminophen  975 mg Oral Q8H     aspirin  325 mg Oral Daily     atorvastatin  10 mg Oral At Bedtime     calcium carbonate 500 mg (elemental)  500 mg Oral QAM     divalproex sodium extended-release  250 mg Oral At Bedtime     multivitamin w/minerals  1 tablet Oral QAM     omeprazole  20 mg Oral Once per day on Sun Tue Thu Sat     sodium chloride (PF)  3 mL Intracatheter Q8H

## 2020-09-02 NOTE — PLAN OF CARE
Vital signs stable on room air. Alert and oriented. Lung sounds clear but diminished. Bowel sounds active, flatus present. Left hip dressing clean dry and intact. Pain controlled with tylenol. Bedrest overnight. Tolerating diet. CMS/neuros intact. Adequate clark output. Tele normal sinus.

## 2020-09-02 NOTE — PLAN OF CARE
VSS, however pt's BP drops when the pt stands up, MD aware.  Echocardiogram done this shift per MD orders, results pending.  Pt's (L) hip dressing is C/D/I.  CMS to the pt's (L) is intact and pedal and post-tibal pulses are strong with palpitation.  Pt states her pain is controlled with PO tylenol per MD orders.  Pt is due to transfer to station 55 when a bed becomes available, pt's  aware of the room transfer.

## 2020-09-02 NOTE — PROGRESS NOTES
"POD# 2    SUBJECTIVE  Feeling much better this morning  Did not do much physically yesterday due to orthostatic hypotension  Pain well controlled with Tylenol    OBJECTIVE:   /70 (BP Location: Right arm)   Pulse 96   Temp 98.1  F (36.7  C) (Oral)   Resp 16   Ht 1.6 m (5' 3\")   Wt 76.3 kg (168 lb 3.4 oz)   SpO2 99%   BMI 29.80 kg/m     Hemoglobin   Date Value Ref Range Status   09/01/2020 9.2 (L) 11.7 - 15.7 g/dL Final   ]   Wound: dressing dry      ASSESSMENT   Awaiting today's Hgb  Vitals improving    PLAN   Mobilize in PT as tolerated, hopefully orthostatic hypotension will not be an issue today  Possible transfer to Orthopedic Unit, would defer that decision to Hospitalist Service    Osiel Madrid MD   "

## 2020-09-03 ENCOUNTER — APPOINTMENT (OUTPATIENT)
Dept: PHYSICAL THERAPY | Facility: CLINIC | Age: 70
DRG: 469 | End: 2020-09-03
Attending: ORTHOPAEDIC SURGERY
Payer: MEDICARE

## 2020-09-03 LAB
HGB BLD-MCNC: 8.6 G/DL (ref 11.7–15.7)
INTERPRETATION ECG - MUSE: NORMAL

## 2020-09-03 PROCEDURE — 25000132 ZZH RX MED GY IP 250 OP 250 PS 637: Mod: GY | Performed by: ORTHOPAEDIC SURGERY

## 2020-09-03 PROCEDURE — 97110 THERAPEUTIC EXERCISES: CPT | Mod: GP | Performed by: PHYSICAL THERAPY ASSISTANT

## 2020-09-03 PROCEDURE — 25800030 ZZH RX IP 258 OP 636: Performed by: ORTHOPAEDIC SURGERY

## 2020-09-03 PROCEDURE — 97530 THERAPEUTIC ACTIVITIES: CPT | Mod: GP | Performed by: PHYSICAL THERAPY ASSISTANT

## 2020-09-03 PROCEDURE — 12000000 ZZH R&B MED SURG/OB

## 2020-09-03 PROCEDURE — 85018 HEMOGLOBIN: CPT | Performed by: ORTHOPAEDIC SURGERY

## 2020-09-03 PROCEDURE — 25000132 ZZH RX MED GY IP 250 OP 250 PS 637: Mod: GY | Performed by: PHYSICIAN ASSISTANT

## 2020-09-03 PROCEDURE — 36415 COLL VENOUS BLD VENIPUNCTURE: CPT | Performed by: ORTHOPAEDIC SURGERY

## 2020-09-03 PROCEDURE — 99232 SBSQ HOSP IP/OBS MODERATE 35: CPT | Performed by: STUDENT IN AN ORGANIZED HEALTH CARE EDUCATION/TRAINING PROGRAM

## 2020-09-03 RX ADMIN — ACETAMINOPHEN 975 MG: 325 TABLET, FILM COATED ORAL at 08:31

## 2020-09-03 RX ADMIN — OXYCODONE HYDROCHLORIDE 5 MG: 5 TABLET ORAL at 12:36

## 2020-09-03 RX ADMIN — OMEPRAZOLE 20 MG: 20 CAPSULE, DELAYED RELEASE ORAL at 08:34

## 2020-09-03 RX ADMIN — MULTIPLE VITAMINS W/ MINERALS TAB 1 TABLET: TAB at 08:31

## 2020-09-03 RX ADMIN — DIVALPROEX SODIUM 250 MG: 250 TABLET, FILM COATED, EXTENDED RELEASE ORAL at 22:34

## 2020-09-03 RX ADMIN — OXYCODONE HYDROCHLORIDE 5 MG: 5 TABLET ORAL at 05:11

## 2020-09-03 RX ADMIN — ACETAMINOPHEN 975 MG: 325 TABLET, FILM COATED ORAL at 17:58

## 2020-09-03 RX ADMIN — ASPIRIN 325 MG: 325 TABLET, COATED ORAL at 08:31

## 2020-09-03 RX ADMIN — POTASSIUM CHLORIDE, DEXTROSE MONOHYDRATE AND SODIUM CHLORIDE: 150; 5; 450 INJECTION, SOLUTION INTRAVENOUS at 03:06

## 2020-09-03 RX ADMIN — ATORVASTATIN CALCIUM 10 MG: 10 TABLET, FILM COATED ORAL at 22:34

## 2020-09-03 RX ADMIN — CALCIUM 500 MG: 500 TABLET ORAL at 08:31

## 2020-09-03 RX ADMIN — ACETAMINOPHEN 975 MG: 325 TABLET, FILM COATED ORAL at 01:50

## 2020-09-03 ASSESSMENT — ACTIVITIES OF DAILY LIVING (ADL)
ADLS_ACUITY_SCORE: 15

## 2020-09-03 NOTE — PROVIDER NOTIFICATION
MD Notification    Notified Person: MD    Notified Person Name: Edmond    Notification Date/Time: @0945    Notification Interaction: web paged    Purpose of Notification: Positive Orthostatic BP, pt was symptomatic with Lightheaded/nausea/sweating/pale.       Orders Received: MD will come assess Patient    Comments: While with PT

## 2020-09-03 NOTE — PROGRESS NOTES
[facility]    Medicine Progress Note - Hospitalist Service       Date of Admission:  8/31/2020    Assessment & Plan    Mary Narvaez is a 69 year old female admitted on 8/31/2020. She is admitted under the care of orthopedics for postoperative management following an elective left total hip arthroplasty.  Hospitalist service is consulted for management following surgery complicated by intraoperative symptomatic acute blood loss anemia.    Acute hypotension and hypoxic respiratory failure secondary to acute blood loss. S/p surgery 08/31  Anemia, ABL.  Type II MI.  Abrupt loss 1.5 L blood with associated hypotension and hypoxia resuscitated with IV 500ml albumin, 2.4L crystalloids, pressors, and 1 unit PRBCs.   Hgb nicki 6.5 and postoperatively following resuscitation 10.5.  Postop EKG unchanged.    Troponin elevation to 0.07 attributed to demand ischemia.    TTE consistent with volume depletion, showed normal wall motion, LVH, and incidental PFO. Hgb has normalized in 9 range.  -Continue hold PTA atenolol, will likely need to hold indefinitely  -become orthostatic today (120/75>84?39) after standing with PT, patient become diaphoretic and sweaty. Pain may have played large role      Plan  -will recommend patient take pain medication PRIOR to PT session  -hold any further IVF as patient appears volume overloaded and is eating  -monitor orthostatics as needed with PT    Prolonged QT interval. EKG measuring 499ms, preop EKG 405ms.  PVCs and Hx WPW s/p ablation.  -PTA medication of atenolol which patient took very infrequently she tells me     Plan  -continue hold BB  -Discontinue Zofran.  -Tele as above.    PFO.  Reported per anesthesia bedside echo eval. Per pt, has known PFO.  -Continue PTA ASA 81mg daily following full-dose in postop phase  -cardiac echo here without obvious PFO, may have pressure related  -can follow up with PCP     Left hip OA status post left total hip arthroplasty via direct  anterior approach (8/31/2020).  -Postop management per orthopedics.  -PTA aspirin 81 mg increased to 325 mg daily for DVT prophylaxis.      DLD.  -Continues on atorvastatin.    GERD.  PPI.    Migraines with aura.  Continue Depakote nightly.     Diet: Regular Diet Adult  Diet  Snacks/Supplements Adult: Boost Shake; Between Meals    DVT Prophylaxis: Defer to primary service  Garcia Catheter: not present  Code Status: Full Code           Disposition Plan   Expected discharge: 1-2 days, pending pain control and clinical improvement in orthostatics. Patient may need outpatient rehab prior to going home, she reports 2 stairs at home. Await further PT evaluation.      Entered: Ama Pruitt DO 09/03/2020, 2:33 PM       The patient's care was discussed with the patient's nurse, self and .    Ama Pruitt DO  Hospitalist Service  [facility]    ______________________________________________________________________    Interval History   Patient reports yesterday she felt the best she has all admission. Today denies nausea, SOA, cough or significant pain. Later paged that patient had episode of pain and hypotension.    Data reviewed today: I reviewed all medications, new labs and imaging results over the last 24 hours.     Physical Exam   Vital Signs: Temp: 99.6  F (37.6  C) Temp src: Oral BP: 109/63 Pulse: 102   Resp: 18 SpO2: 97 % O2 Device: None (Room air)    Weight: 168 lbs 3.38 oz  GEN: well-developed, well-nourished, appears comfortable  HEENT: NCAT, EOM intact bilaterally, nose & mouth patent, mucous membranes moist  CHEST: lungs CTA bilaterally, no increased work of breathing, no wheeze, crackles, rhonchi  HEART: RRR, S1 & S2, no murmur  ABD: soft, nontender, nondistended, no guarding or rigidity, hypoactive BS in all 4 quadrants  MSK: Bandage over L hip, able to move all extremities, no pain on palpation  NEURO: awake, alert, oriented to name, place, and time. CN II-XII grossly intact.  Sensation grossly intact to light touch.   SKIN: warm & dry without rash, no pedal edema    Data   Recent Labs   Lab 09/03/20  0817 09/02/20  0826 09/01/20  1140 09/01/20  0622 08/31/20  1412 08/31/20  1330  08/31/20  1230  08/31/20  1200   WBC  --   --   --  10.1 16.6*  --   --   --   --   --    HGB 8.6* 9.1* 9.2* 9.0* 10.5* 9.2*   < >  --    < > 6.5*   MCV  --   --   --  91 91  --   --   --   --   --    PLT  --   --   --  152 209  --   --   --   --  150   INR  --   --   --   --  1.33*  --   --   --   --   --    NA  --   --   --  139 143 142   < > 143   < >  --    POTASSIUM  --   --   --  3.9 3.5 3.8   < > 3.6   < >  --    CHLORIDE  --   --   --  108 113*  --   --  113*  --   --    CO2  --   --   --  28 25  --   --  25  --   --    BUN  --   --   --  9 15  --   --  15  --   --    CR  --   --   --  0.79 0.66  --   --  0.71  --   --    ANIONGAP  --   --   --  3 5  --   --  5  --   --    AZEEM  --   --   --  7.5* 7.1*  --   --  7.1*  --   --    GLC  --   --   --  145* 201*  --   --  191*  --   --    TROPI  --   --   --   --  0.079*  --   --   --   --   --     < > = values in this interval not displayed.     No results found for this or any previous visit (from the past 24 hour(s)).  Medications     dextrose 5% and 0.45% NaCl + KCl 20 mEq/L Stopped (09/03/20 0830)       acetaminophen  975 mg Oral Q8H     aspirin  325 mg Oral Daily     atorvastatin  10 mg Oral At Bedtime     calcium carbonate 500 mg (elemental)  500 mg Oral QAM     divalproex sodium extended-release  250 mg Oral At Bedtime     multivitamin w/minerals  1 tablet Oral QAM     omeprazole  20 mg Oral Once per day on Sun Tue Thu Sat     sodium chloride (PF)  3 mL Intracatheter Q8H

## 2020-09-03 NOTE — PLAN OF CARE
Patient plan: return home with assist of spouse    Current status: Pt performed supine to sit transfer with min assist.  Sit to/from stand transfer with min assist.  Pt amb 3 ft using wheeled walker and min assist.  Pt c/o extreme pain, hot, burning sensation in lateral thigh above her knee.  Pt became pale and nauseous in standing.  Needed to sit down.  BP dropped.  Pt returned to bed with improved symptoms noted.      Supine /75  Seated /84  Standing BP 84/39     Anticipated status at discharge: Anticipate when patient medically stable & negative orthostatics she will be Alexis for L LE with bed mobility and SBA for transfers/gait with use of a walker, CGA with stairs & railing. Walker order has been submitted.

## 2020-09-03 NOTE — PLAN OF CARE
A&Ox4. VSS on RA ex low BP's at times and temps in high 90's. Dressing C/D/I. Minimal Pain controlled with Oxycodone and Tylenol . Garcia in place (strict I &O).  Tolerating Reg Diet. Progressing per POC. Will Cont to monitor.

## 2020-09-03 NOTE — PROGRESS NOTES
"POD# 3    SUBJECTIVE  Pain well controlled with Tylenol and occasional Oxycodone  Was able to ambulate in her room and sit in a chair yesterday afternoon without orthostatic hypotension  Remains tachycardic    OBJECTIVE:   /75 (BP Location: Right arm)   Pulse 100   Temp 98.8  F (37.1  C) (Oral)   Resp 16   Ht 1.6 m (5' 3\")   Wt 76.3 kg (168 lb 3.4 oz)   SpO2 97%   BMI 29.80 kg/m     Hemoglobin   Date Value Ref Range Status   09/02/2020 9.1 (L) 11.7 - 15.7 g/dL Final   ]   Wound: CDI  Today's Hgb pending, but has been very stable at ~ 9.0  UOP yesterday > 4,000 ml    ASSESSMENT   Overall doing well  Continued tachycardia, but improved  Orthostatic hypotension improving  Does not appear to be hypovolemic    PLAN   Discontinue IVF and Garcia catheter  Mobilize in PT  Anticipate discharge home later today if she does well in PT    Osiel Madrid MD   "

## 2020-09-03 NOTE — PROGRESS NOTES
A/OX4. VSS except for low grade temp @ 99.4 F. L Dressing CDI. IVF infusing @125cc/hr. Pt can be  hypotensive and experience dizziness sometimes per report from nurse @ station 33. Although pt has denied any dizziness since she came up from 33.CMS intact. AX1. Minimal pain, 1/10; declines pain medication. Garcia in place for strict I&O. Will continue to monitor.

## 2020-09-04 ENCOUNTER — APPOINTMENT (OUTPATIENT)
Dept: PHYSICAL THERAPY | Facility: CLINIC | Age: 70
DRG: 469 | End: 2020-09-04
Attending: ORTHOPAEDIC SURGERY
Payer: MEDICARE

## 2020-09-04 VITALS
HEIGHT: 63 IN | BODY MASS INDEX: 29.8 KG/M2 | SYSTOLIC BLOOD PRESSURE: 118 MMHG | OXYGEN SATURATION: 99 % | RESPIRATION RATE: 18 BRPM | TEMPERATURE: 99.1 F | WEIGHT: 168.21 LBS | DIASTOLIC BLOOD PRESSURE: 70 MMHG | HEART RATE: 104 BPM

## 2020-09-04 LAB
BASOPHILS # BLD AUTO: 0 10E9/L (ref 0–0.2)
BASOPHILS NFR BLD AUTO: 0.4 %
BLD PROD TYP BPU: NORMAL
BLD PROD TYP BPU: NORMAL
BLD UNIT ID BPU: 0
BLD UNIT ID BPU: 0
BLOOD PRODUCT CODE: NORMAL
BLOOD PRODUCT CODE: NORMAL
BPU ID: NORMAL
BPU ID: NORMAL
DIFFERENTIAL METHOD BLD: ABNORMAL
EOSINOPHIL # BLD AUTO: 0.1 10E9/L (ref 0–0.7)
EOSINOPHIL NFR BLD AUTO: 1.9 %
ERYTHROCYTE [DISTWIDTH] IN BLOOD BY AUTOMATED COUNT: 12.9 % (ref 10–15)
HCT VFR BLD AUTO: 24 % (ref 35–47)
HGB BLD-MCNC: 8.3 G/DL (ref 11.7–15.7)
IMM GRANULOCYTES # BLD: 0 10E9/L (ref 0–0.4)
IMM GRANULOCYTES NFR BLD: 0.3 %
LYMPHOCYTES # BLD AUTO: 2.1 10E9/L (ref 0.8–5.3)
LYMPHOCYTES NFR BLD AUTO: 28.6 %
MCH RBC QN AUTO: 31.9 PG (ref 26.5–33)
MCHC RBC AUTO-ENTMCNC: 34.6 G/DL (ref 31.5–36.5)
MCV RBC AUTO: 92 FL (ref 78–100)
MONOCYTES # BLD AUTO: 0.7 10E9/L (ref 0–1.3)
MONOCYTES NFR BLD AUTO: 9.3 %
NEUTROPHILS # BLD AUTO: 4.3 10E9/L (ref 1.6–8.3)
NEUTROPHILS NFR BLD AUTO: 59.5 %
NRBC # BLD AUTO: 0 10*3/UL
NRBC BLD AUTO-RTO: 0 /100
PLATELET # BLD AUTO: 194 10E9/L (ref 150–450)
RBC # BLD AUTO: 2.6 10E12/L (ref 3.8–5.2)
TRANSFUSION STATUS PATIENT QL: NORMAL
WBC # BLD AUTO: 7.2 10E9/L (ref 4–11)

## 2020-09-04 PROCEDURE — 97110 THERAPEUTIC EXERCISES: CPT | Mod: GP

## 2020-09-04 PROCEDURE — 36415 COLL VENOUS BLD VENIPUNCTURE: CPT | Performed by: STUDENT IN AN ORGANIZED HEALTH CARE EDUCATION/TRAINING PROGRAM

## 2020-09-04 PROCEDURE — 25000132 ZZH RX MED GY IP 250 OP 250 PS 637: Mod: GY | Performed by: ORTHOPAEDIC SURGERY

## 2020-09-04 PROCEDURE — 97116 GAIT TRAINING THERAPY: CPT | Mod: GP

## 2020-09-04 PROCEDURE — 85025 COMPLETE CBC W/AUTO DIFF WBC: CPT | Performed by: STUDENT IN AN ORGANIZED HEALTH CARE EDUCATION/TRAINING PROGRAM

## 2020-09-04 PROCEDURE — 99239 HOSP IP/OBS DSCHRG MGMT >30: CPT | Performed by: STUDENT IN AN ORGANIZED HEALTH CARE EDUCATION/TRAINING PROGRAM

## 2020-09-04 PROCEDURE — 97530 THERAPEUTIC ACTIVITIES: CPT | Mod: GP

## 2020-09-04 RX ADMIN — OXYCODONE HYDROCHLORIDE 5 MG: 5 TABLET ORAL at 10:02

## 2020-09-04 RX ADMIN — ACETAMINOPHEN 975 MG: 325 TABLET, FILM COATED ORAL at 09:12

## 2020-09-04 RX ADMIN — ACETAMINOPHEN 975 MG: 325 TABLET, FILM COATED ORAL at 00:34

## 2020-09-04 RX ADMIN — ASPIRIN 325 MG: 325 TABLET, COATED ORAL at 09:12

## 2020-09-04 RX ADMIN — CALCIUM 500 MG: 500 TABLET ORAL at 09:12

## 2020-09-04 RX ADMIN — MULTIPLE VITAMINS W/ MINERALS TAB 1 TABLET: TAB at 09:12

## 2020-09-04 ASSESSMENT — ACTIVITIES OF DAILY LIVING (ADL)
ADLS_ACUITY_SCORE: 15

## 2020-09-04 NOTE — PROGRESS NOTES
"POD# 4    SUBJECTIVE  Pain well controlled at rest  Unfortunately, when up ambulating, she has a pain in her distal thigh and then becomes hypotensive  Was unable to do much in PT due to her orthostatic hypotension  This morning she feels better  She was able to ambulate to the bathroom, and the pain was not as bad, and she did not feel hypotensive  She is hopeful that she will be able to achieve her goals in PT today and discharge home    OBJECTIVE:   /66   Pulse 93   Temp 99.3  F (37.4  C)   Resp 16   Ht 1.6 m (5' 3\")   Wt 76.3 kg (168 lb 3.4 oz)   SpO2 98%   BMI 29.80 kg/m     Hemoglobin   Date Value Ref Range Status   09/04/2020 8.3 (L) 11.7 - 15.7 g/dL Final   ]   Wound: dressing dry      ASSESSMENT   Overall doing well  Orthostatic hypotension related to pain has been the limiting factor  Has had a mild drop in Hgb the last couple of days, but not out of the ordinary for this point in time from surgery    PLAN   Mobilize in PT  If she does okay in PT, she could discharge home today  She plans to follow up with her primary MD for Hgb check next week if she is discharged today      Osiel Madrid MD   "

## 2020-09-04 NOTE — PROGRESS NOTES
Reviewed discharge with pt and .  Verbalized understanding.  Paperwork given to pt along with medications and dressing supplies.  Pt to discharge home with .

## 2020-09-04 NOTE — PLAN OF CARE
Patient plan: return home with assist of spouse     Current status: Pt transferred supine to sit on EOB with assist needed for L LE and use of side rail. Pt transferred STSx2 from EOB and x4 from w/c and x1 from BSC with CGA. Gait training 5'x5 between transfers with CGA. Pt wheeled down to therapy gym to perform stairs. Pt performed 3 steps x1 with HHA and cane, second person on gait belt for safety. Knee buckling x1 noted when first attempting to step up. Pt performed stairs very slowly, prior to descending pt needed to sit in chair x4 minutes d/t fatigue, VSS. Pt descended with Rosio.  Following gait pt needed to use BSC. Pt left sitting in chair with alarm on and needs in reach.      Anticipated status at discharge: Anticipate when patient medically stable & negative orthostatics she will be Rosio for L LE with bed mobility and SBA for transfers/gait with use of a walker, CGA with stairs & railing. Walker order has been submitted.

## 2020-09-04 NOTE — PLAN OF CARE
A&Ox4. VSS on RA at rest. Positive Orthostatic BP, Pt was symptomatic. MD notified. Denies chest pain or SOB. Tele NSR. Dressing CDI. CMS intact, +2palable pedal pulses. Pain managed with prn Oxycodone. Tolerating reg diet. +BS. +flatus. Garcia removed. Voiding. PVR bladder scanned 63ml. Hgb 8.6. Up Ax1 walker+GB. Repeat orthostatic BP this afternoon negative. No lightheaded/dizzy. Plan is discharge to home tomorrow.

## 2020-09-04 NOTE — PLAN OF CARE
Pt A&Ox4. VSS on RA except tachy with amublation. CMS intact. Dressing c/d/i. Voiding adequately. Up with assist of 1 to bsc. Pain managed with scheduled tylenol, ice applied. IV SL. Tele NSR. Will continue to monitor.

## 2020-09-04 NOTE — DISCHARGE SUMMARY
[facility]    Discharge Summary  Hospitalist    Date of Admission:  8/31/2020  Date of Discharge:  9/4/2020  Discharging Provider: Ama Pruitt DO  Date of Service (when I saw the patient): 09/04/20    Discharge Diagnoses   Acute blood loss anemia-shock  Left hip OA s/p L total arthroplasty  Prolonged QT  Hx of WPW    History of Present Illness   Ms. Mary Narvaez is a 69 year old female who was admitted on 8/31/2020 for elective hip repair. Intraoperatively but post procedure she developed significant blood loss with hypotension and shock, briefly requiring pressors. Hb nicki was 6.5 and she received 1 unit of pRBC with resulting Hb of 10.5. She was then transferred to the hospital service. Post operatively she suffered from symptomatic orthostatic hypotension requiring. Slowly her symptoms improved with IV fluids, rest and pain medications. On POD 3 she suffered from one episode of hypotension that appeared more related to pain and a vasovagal response and she was encouraged to take her oxycodone at home prior to performing physical activity. She continued to work daily with PT, with her  at her side. She will discharge in stable condition without hypotension in 24 hours. She declined any further outpatient PT and all are in agreement she can continue rehab therapies at home with family support.  On discharge she will continue aspirin 325mg for 6 weeks post operatively. She will hold PTA atenolol until further discussion with her PCP. On discharge Hb is 8.3. No signs of bleeding and likely related to her normal post operative course. She will have repeat Hb taken by her PCP in 3-4 days.       Ama Pruitt DO  Internal Medicine Hospitalist [place]    Unresulted Labs Ordered in the Past 30 Days of this Admission     No orders found from 8/1/2020 to 9/1/2020.          Code Status   Full Code       Primary Care Physician   Natalia Iniguez MD    GEN: well-developed, well-nourished,  appears comfortable  HEENT: NCAT, EOM intact bilaterally, nose & mouth patent, mucous membranes moist  CHEST: lungs CTA bilaterally, no increased work of breathing, no wheeze, crackles, rhonchi  HEART: RRR, S1 & S2, no murmur  ABD: soft, nontender, nondistended, no guarding or rigidity, hypoactive BS in all 4 quadrants  MSK: Bandage over L hip, able to move all extremities, no pain on palpation  NEURO: awake, alert, oriented to name, place, and time. CN II-XII grossly intact. Sensation grossly intact to light touch.   SKIN: warm & dry without rash, no pedal edema    Discharge Disposition   Discharged to home  Condition at discharge: Stable    Consultations This Hospital Stay   PHYSICAL THERAPY ADULT IP CONSULT  HOSPITALIST IP CONSULT    Time Spent on this Encounter   I, Ama Pruitt DO, personally saw the patient today and spent greater than 30 minutes discharging this patient.    Discharge Orders      Weight bearing status - As tolerated     Diet Instructions    Resume pre-procedure diet     Shower    No shower for 24 hours post procedure. May shower Postoperative Day (POD)  1     Do not - immerse incision in water until sutures removed    Do not immerse incision in water until sutures removed     Dressing    Keep dressing clean and dry.  Dressing / incisional care as instructed by RN and or Surgeon     Ice to affected area    Ice to operative site PRN     Discharge Instructions    Patient to follow up with appointment in 10-14 days.     Reason for your hospital stay    Diagnosis: DJD Hip   Procedure: NOLA  Surgeon: Osiel Madrid MD  Patient underwent total hip replacement without complication.  Patient's hospital stay included physical therapy and DVT prophylaxis. Patient will follow-up in the office 10-14 days post-op for wound check. Please refer to chart for any other specifics of this hospital stay.    Chaka Fritz PA-C     Follow-up and recommended labs and tests     You will follow up with   Julius or his physician assistant Chaka Fritz in 2 weeks after surgery.  Your recovery process and incision will be checked.     Activity    Your activity upon discharge: activity as tolerated     Wound care and dressings    Instructions to care for your wound at home: Your dressing will be changed before you leave the hospital.  Leave that dressing in place for 48 hours.  You may shower at that time, allowing water to run over the incision.  The steri-strips will eventually fall off, do not remove these until that time.  You may re-apply a clean dressing after the shower.  You may perform daily dressing changes at that point, keeping the incision clean and dry.    Do not allow the dressing to soak in water.  If the dressing becomes completely saturated, you may remove it and perform basic wound management by keeping the wound clean, dry, and covered.    Notify Dr. Madrid's office if the dressing becomes completely saturated from drainage or blood from the wound, or if the wound or dressing is leaking.  .     Walker Order    DME Documentation:   Describe the reason for need to support medical necessity: impaired gait stability      I, the undersigned, certify that the above prescribed supplies are medically necessary for this patient and is both reasonable and necessary in reference to accepted standards of medical and necessary in reference to accepted standards of medical practice in the treatment of this patient's condition and is not prescribed as a convenience.     Diet    Follow this diet upon discharge: Orders Placed This Encounter      Regular Diet Adult     Discharge Medications   Current Discharge Medication List      START taking these medications    Details   oxyCODONE (ROXICODONE) 5 MG tablet Take 1-2 tablets (5-10 mg) by mouth every 3 hours as needed for pain (Moderate to Severe)  Qty: 30 tablet, Refills: 0    Associated Diagnoses: Status post total replacement of left hip      senna-docusate  (SENOKOT-S/PERICOLACE) 8.6-50 MG tablet Take 1-2 tablets by mouth 2 times daily Take while on oral narcotics to prevent or treat constipation.  Qty: 30 tablet, Refills: 0    Comments: While taking narcotics  Associated Diagnoses: Status post total replacement of left hip         CONTINUE these medications which have CHANGED    Details   aspirin (ASA) 325 MG EC tablet Take 1 tablet (325 mg) by mouth daily  Qty: 42 tablet, Refills: 0    Associated Diagnoses: Status post total replacement of left hip         CONTINUE these medications which have NOT CHANGED    Details   acetaminophen (TYLENOL) 500 MG tablet Take 1,000 mg by mouth 3 times daily       atorvastatin (LIPITOR) 10 MG tablet Take 10 mg by mouth At Bedtime       calcium carbonate (OS-AZEEM) 1500 (600 Ca) MG tablet Take 600 mg by mouth every morning      divalproex sodium extended-release (DEPAKOTE ER) 250 MG 24 hr tablet Take 250-500 mg by mouth nightly as needed (migraines)       !! Multiple Vitamins-Minerals (VISION FORMULA PO) Take 1 tablet by mouth every morning      !! Multiple Vitamins-Minerals (WOMENS 50+ MULTI VITAMIN/MIN) TABS Take 1 tablet by mouth every morning      omeprazole (PRILOSEC) 20 MG capsule Take 1 capsule by mouth four times a week (as needed) on Sunday,Tuesday,Thursday, Saturday.      Urea 40 % CREA Externally apply topically three times a week after showering.       !! - Potential duplicate medications found. Please discuss with provider.      STOP taking these medications       atenolol (TENORMIN) 25 MG tablet Comments:   Reason for Stopping:             Allergies   Allergies   Allergen Reactions     Adhesive Tape      Codeine      Data   Most Recent 3 CBC's:  Recent Labs   Lab Test 09/04/20  0605 09/03/20  0817 09/02/20  0826  09/01/20  0622 08/31/20  1412   WBC 7.2  --   --   --  10.1 16.6*   HGB 8.3* 8.6* 9.1*   < > 9.0* 10.5*   MCV 92  --   --   --  91 91     --   --   --  152 209    < > = values in this interval not  displayed.      Most Recent 3 BMP's:  Recent Labs   Lab Test 09/01/20  0622 08/31/20  1412 08/31/20  1330  08/31/20  1230    143 142   < > 143   POTASSIUM 3.9 3.5 3.8   < > 3.6   CHLORIDE 108 113*  --   --  113*   CO2 28 25  --   --  25   BUN 9 15  --   --  15   CR 0.79 0.66  --   --  0.71   ANIONGAP 3 5  --   --  5   AZEEM 7.5* 7.1*  --   --  7.1*   * 201*  --   --  191*    < > = values in this interval not displayed.     Most Recent 2 LFT's:No lab results found.  Most Recent INR's and Anticoagulation Dosing History:  Anticoagulation Dose History     Recent Dosing and Labs Latest Ref Rng & Units 8/31/2020    INR 0.86 - 1.14 1.33(H)        Most Recent 3 Troponin's:  Recent Labs   Lab Test 08/31/20  1412   TROPI 0.079*     Most Recent Cholesterol Panel:No lab results found.  Most Recent 6 Bacteria Isolates From Any Culture (See EPIC Reports for Culture Details):No lab results found.  Most Recent TSH, T4 and A1c Labs:No lab results found.

## 2020-09-04 NOTE — PLAN OF CARE
Patient plan: return home with assist of spouse     Current status: Pt transferred supine to/from sit on EOB with assist needed to move L LE. Pt transferred STSx5 from bed/wheelchair and BSC with SBA. Pt needed to use BSC, able to perform hygiene IND. Gait training 10'+5'+10' with FWW and CGA. Pt ascended/descended 2 steps x1 with HHA of  on L side and SEC on R side and therapist SBA for safety. Pt takes extra time to complete stairs d/t fatigue/pain and being scared of placing weight on L LE. Pt performed stairs safely with husbands support. Following gait pt transferred back to supine with assist for L LE. All questions answered, HEP sheet provided. Pt left with alarm on and needs in reach.      Anticipated status at discharge: Anticipate when patient medically stable & negative orthostatics she will be Alexis for L LE with bed mobility and SBA for transfers/gait with use of a walker, CGA with stairs & railing. Walker order has been submitted.    Pt discharged home with assist of spouse. PT goals partially met.

## 2020-09-08 NOTE — DISCHARGE SUMMARY
[facility]    Discharge Summary  Orthopedics    Date of Admission:  8/31/2020  Date of Discharge:  9/4/2020  4:14 PM  Discharging Provider: Tien Fritz PA-C  Date of Service: 9/4/2020    Discharge Diagnoses      Status post total replacement of left hip  Status post total hip replacement, left    Procedure/Surgery Information   Procedure: Procedure(s):  LEFT TOTAL HIP ARTHROPLASTY, DIRECT ANTERIOR APPROACH   Surgeon(s): Surgeon(s) and Role:     * Osiel Madrid MD - Primary     * Tien Fritz PA-C - Assisting           History of Present Illness   Mary Narvaez is a 69 year old female who presented with significant pain and degenerative changes in her left hip.    Hospital Course   Mary Narvaez was admitted on 8/31/2020.  The following problems were addressed during her hospitalization:  Active Problems:    Status post total hip replacement, left    Postoperative hypotension      Post-operative pain control: included oxycodone and will be oxycodone on discharge.     Medications discontinued or adjusted during this hospitalization:  Start aspirin 325 mg daily for 6 weeks for DVT prophylaxis.    Antibiotics prescribed at discharge: None prescribed     Tien Fritz PA-C, GOLD    Discharge Disposition   Discharged to home   Condition at discharge: Good    Unresulted Labs Ordered in the Past 30 Days of this Admission     No orders found from 8/1/2020 to 9/1/2020.          Primary Care Physician   Natalia Iniguez MD    Consultations This Hospital Stay   PHYSICAL THERAPY ADULT IP CONSULT  HOSPITALIST IP CONSULT    Time Spent on this Encounter   I have spent less than 30 minutes on this discharge.    Discharge Orders      Hemoglobin    Last Lab Result: Hemoglobin (g/dL)       Date                     Value                 09/04/2020               8.3 (L)          ----------     Weight bearing status - As tolerated     Diet Instructions    Resume pre-procedure diet      Shower    No shower for 24 hours post procedure. May shower Postoperative Day (POD)  1     Do not - immerse incision in water until sutures removed    Do not immerse incision in water until sutures removed     Dressing    Keep dressing clean and dry.  Dressing / incisional care as instructed by RN and or Surgeon     Ice to affected area    Ice to operative site PRN     Discharge Instructions    Patient to follow up with appointment in 10-14 days.     Reason for your hospital stay    Diagnosis: DJD Hip   Procedure: NOLA  Surgeon: Osiel Madrid MD  Patient underwent total hip replacement without complication.  Patient's hospital stay included physical therapy and DVT prophylaxis. Patient will follow-up in the office 10-14 days post-op for wound check. Please refer to chart for any other specifics of this hospital stay.    Chaka Fritz PA-C     Follow-up and recommended labs and tests     You will follow up with Dr. Madrid or his physician assistant Chaka Fritz in 2 weeks after surgery.  Your recovery process and incision will be checked.     Activity    Your activity upon discharge: activity as tolerated     Wound care and dressings    Instructions to care for your wound at home: Your dressing will be changed before you leave the hospital.  Leave that dressing in place for 48 hours.  You may shower at that time, allowing water to run over the incision.  The steri-strips will eventually fall off, do not remove these until that time.  You may re-apply a clean dressing after the shower.  You may perform daily dressing changes at that point, keeping the incision clean and dry.    Do not allow the dressing to soak in water.  If the dressing becomes completely saturated, you may remove it and perform basic wound management by keeping the wound clean, dry, and covered.    Notify Dr. Madrid's office if the dressing becomes completely saturated from drainage or blood from the wound, or if the wound or  dressing is leaking.  .     Walker Order    DME Documentation:   Describe the reason for need to support medical necessity: impaired gait stability      I, the undersigned, certify that the above prescribed supplies are medically necessary for this patient and is both reasonable and necessary in reference to accepted standards of medical and necessary in reference to accepted standards of medical practice in the treatment of this patient's condition and is not prescribed as a convenience.     Diet    Follow this diet upon discharge: Orders Placed This Encounter      Regular Diet Adult     Discharge Medications   Start aspirin 325 mg daily for 6 weeks for DVT prophylaxis.  Discharge Medication List as of 9/4/2020  3:25 PM      START taking these medications    Details   oxyCODONE (ROXICODONE) 5 MG tablet Take 1-2 tablets (5-10 mg) by mouth every 3 hours as needed for pain (Moderate to Severe), Disp-30 tablet,R-0, E-Prescribe      senna-docusate (SENOKOT-S/PERICOLACE) 8.6-50 MG tablet Take 1-2 tablets by mouth 2 times daily Take while on oral narcotics to prevent or treat constipation., Disp-30 tablet,R-0, E-PrescribeWhile taking narcotics         CONTINUE these medications which have CHANGED    Details   aspirin (ASA) 325 MG EC tablet Take 1 tablet (325 mg) by mouth daily, Disp-42 tablet,R-0, No Print Out         CONTINUE these medications which have NOT CHANGED    Details   acetaminophen (TYLENOL) 500 MG tablet Take 1,000 mg by mouth 3 times daily , Historical      atorvastatin (LIPITOR) 10 MG tablet Take 10 mg by mouth At Bedtime , Historical      calcium carbonate (OS-AZEEM) 1500 (600 Ca) MG tablet Take 600 mg by mouth every morning, Historical      divalproex sodium extended-release (DEPAKOTE ER) 250 MG 24 hr tablet Take 250-500 mg by mouth nightly as needed (migraines) , Historical      !! Multiple Vitamins-Minerals (VISION FORMULA PO) Take 1 tablet by mouth every morning, Historical      !! Multiple  Vitamins-Minerals (WOMENS 50+ MULTI VITAMIN/MIN) TABS Take 1 tablet by mouth every morning, Historical      omeprazole (PRILOSEC) 20 MG capsule Take 1 capsule by mouth four times a week (as needed) on ,Tuesday,Thursday, Saturday., Historical      Urea 40 % CREA Externally apply topically three times a week after showering.Historical       !! - Potential duplicate medications found. Please discuss with provider.      STOP taking these medications       atenolol (TENORMIN) 25 MG tablet Comments:   Reason for Stopping:             Allergies   Allergies   Allergen Reactions     Adhesive Tape      Codeine      Data   Results for orders placed or performed during the hospital encounter of 20   XR Surgery HETAL Fluoro L/T 5 Min w Stills    Narrative    XR SURGERY C-ARM FLUOROSCOPY LESS THAN FIVE MINUTES WITH STILLS    2020 12:35 PM     HISTORY: Left direct anterior hip arthroplasty.     Fluoroscopy time 0.6, 1 image, C-arm 2    COMPARISON: None.      Impression    IMPRESSION: Single spot fluoroscopic image demonstrates total hip  arthroplasty. No periprosthetic fracture. Total fluoroscopic time is  0.6 minutes.    STACI PERDUE MD   XR Pelvis w Hip Left 1 View    Narrative    XR PELVIS AND LEFT HIP ONE VIEW   2020 3:07 PM     HISTORY:  Postoperative hip arthroplasty    Comparison: None.      Impression    IMPRESSION: There is a new left total hip arthroplasty. Excellent  position/alignment. No evidence of fracture or complication. Moderate  osteoarthrosis in the right hip.    ALCIRA TRUJILLO MD   Echocardiogram Complete    Narrative    [id number]  [id number]  [id number]  [id number]^BARTHELL^DL^NICOLE [facility]  Echocardiography Laboratory  [address] [place] [address]        Name: ISAEL JOSEPH  MRN: [id number]  : 1950  Study Date: 2020 10:15 AM  Age: 69 yrs  Gender: Female  Patient Location: [facility]  Reason For Study: Shock  Ordering Physician:  BARTHELL, JOANNA  Referring Physician: Natalia Iniguez MD  Performed By: Natalia Blankenship [facility]     BSA: 1.8 m2  Height: 63 in  Weight: 168 lb  HR: 95  BP: 80/55 mmHg  _____________________________________________________________________________  __        Procedure  Complete Portable Bubble Echo Adult. Optison (NDC #9935-8891) given  intravenously.  _____________________________________________________________________________  __        Interpretation Summary     The left ventricle is normal in structure, function and size.  The visual ejection fraction is estimated at 60-65%.  The right ventricle is normal in structure, function and size.  A contrast injection (Bubble Study) was performed that was negative for flow  across the interatrial septum.  Valve structures without significant dysfunction.  Normal aortic root size.  The inferior vena cava is normal.  No pericardial effusion.     No prior for comparison.  _____________________________________________________________________________  __        Left Ventricle  The left ventricle is normal in structure, function and size. Left ventricular  systolic function is normal. The visual ejection fraction is estimated at 60-  65%. Left ventricular diastolic function is normal. Normal left ventricular  wall motion.     Right Ventricle  The right ventricle is normal in structure, function and size.     Atria  Normal left atrial size. Right atrial size is normal. A contrast injection  (Bubble Study) was performed that was negative for flow across the interatrial  septum.     Mitral Valve  The mitral valve is normal in structure and function. There is trace mitral  regurgitation.        Tricuspid Valve  The tricuspid valve is normal in structure and function. Right ventricle  systolic pressure estimate normal.     Aortic Valve  The aortic valve is normal in structure and function. Normal tricuspid aortic  valve.     Pulmonic Valve  The pulmonic valve is normal in structure and  function.     Vessels  The aortic root is normal size. Normal size ascending aorta. The inferior vena  cava is normal.     Pericardium  There is no pericardial effusion.     _____________________________________________________________________________  __  MMode/2D Measurements & Calculations  IVSd: 0.89 cm  LVIDd: 3.9 cm  LVIDs: 2.5 cm  LVPWd: 0.89 cm  FS: 37.2 %  LV mass(C)d: 104.5 grams  LV mass(C)dI: 58.2 grams/m2     Ao root diam: 3.2 cm  LA dimension: 2.7 cm  asc Aorta Diam: 3.3 cm  LA/Ao: 0.84  LA Volume (BP): 22.2 ml  LA Volume Index (BP): 12.3 ml/m2  RWT: 0.45        Doppler Measurements & Calculations  MV E max tayler: 77.6 cm/sec  MV A max tayler: 83.4 cm/sec  MV E/A: 0.93  MV dec time: 0.13 sec     PA acc time: 0.11 sec  E/E' av.4  Lateral E/e': 8.5  Medial E/e': 8.3           _____________________________________________________________________________  __           Report approved by: Irving Jacome 2020 12:19 PM          Hemoglobin   Date Value Ref Range Status   2020 8.3 (L) 11.7 - 15.7 g/dL Final   2020 8.6 (L) 11.7 - 15.7 g/dL Final   ]  Last Basic Metabolic Panel:  Lab Results   Component Value Date     2020      Lab Results   Component Value Date    POTASSIUM 3.9 2020     Lab Results   Component Value Date    CHLORIDE 108 2020     Lab Results   Component Value Date    AZEEM 7.5 2020     Lab Results   Component Value Date    CO2 28 2020     Lab Results   Component Value Date    BUN 9 2020     Lab Results   Component Value Date    CR 0.79 2020     Lab Results   Component Value Date     2020

## 2024-06-17 ENCOUNTER — MEDICAL CORRESPONDENCE (OUTPATIENT)
Dept: HEALTH INFORMATION MANAGEMENT | Facility: CLINIC | Age: 74
End: 2024-06-17
Payer: MEDICARE

## 2024-06-25 ENCOUNTER — TRANSCRIBE ORDERS (OUTPATIENT)
Dept: OTHER | Age: 74
End: 2024-06-25

## 2024-06-25 DIAGNOSIS — K30 DELAYED GASTRIC EMPTYING: Primary | ICD-10-CM

## (undated) DEVICE — SU VICRYL 1 CTX 36" J371H

## (undated) DEVICE — DRAPE SHEET REV FOLD 3/4 9349

## (undated) DEVICE — PREP CHLORAPREP 26ML TINTED ORANGE  260815

## (undated) DEVICE — SUCTION IRR SYSTEM W/O TIP INTERPULSE HANDPIECE 0210-100-000

## (undated) DEVICE — PACK TOTAL HIP W/U DRAPE SOP15HUFSC

## (undated) DEVICE — Device

## (undated) DEVICE — SU MONOCRYL 3-0 PS-2 27" Y427H

## (undated) DEVICE — BLADE SAW SAGITTAL STRK 25X79.5X1.24MM 4/2000 2108-318-000

## (undated) DEVICE — MANIFOLD NEPTUNE 4 PORT 700-20

## (undated) DEVICE — DRAPE C-ARM 60X42" 1013

## (undated) DEVICE — DRAPE STERI TOWEL LG 1010

## (undated) DEVICE — SUCTION TIP YANKAUER STR K87

## (undated) DEVICE — SOL NACL 0.9% INJ 1000ML BAG 2B1324X

## (undated) DEVICE — DRAPE CONVERTORS U-DRAPE 60X72" 8476

## (undated) DEVICE — SPONGE LAP 18X18" X8435

## (undated) DEVICE — DRSG STERI STRIP 1/2X4" R1547

## (undated) DEVICE — ESU GROUND PAD UNIVERSAL W/O CORD

## (undated) DEVICE — SOL NACL 0.9% IRRIG 1000ML BOTTLE 2F7124

## (undated) DEVICE — GLOVE PROTEXIS W/NEU-THERA 8.0  2D73TE80

## (undated) DEVICE — SU VICRYL 2-0 CT-1 27" UND J259H

## (undated) DEVICE — GOWN IMPERVIOUS SPECIALTY XLG/XLONG 32474

## (undated) DEVICE — SU ETHIBOND 1 CT-1 30" X425H

## (undated) DEVICE — LINEN TOWEL PACK X5 5464

## (undated) DEVICE — SOLUTION WOUND CLEANSING 3/4OZ 10% PVP EA-L3011FB-50

## (undated) DEVICE — GLOVE PROTEXIS POWDER FREE 7.5 ORTHOPEDIC 2D73ET75

## (undated) DEVICE — DRAPE IOBAN INCISE 23X17" 6650EZ

## (undated) DEVICE — SOL BENZOIN 0.5OZ

## (undated) DEVICE — SOL WATER IRRIG 1000ML BOTTLE 2F7114

## (undated) DEVICE — GLOVE PROTEXIS BLUE W/NEU-THERA 8.0  2D73EB80

## (undated) DEVICE — GLOVE PROTEXIS W/NEU-THERA 7.5  2D73TE75

## (undated) DEVICE — DRSG ADAPTIC 3X8" 6113

## (undated) DEVICE — BONE CLEANING TIP INTERPULSE  0210-010-000

## (undated) RX ORDER — PROPOFOL 10 MG/ML
INJECTION, EMULSION INTRAVENOUS
Status: DISPENSED
Start: 2020-08-31

## (undated) RX ORDER — ALBUMIN, HUMAN INJ 5% 5 %
SOLUTION INTRAVENOUS
Status: DISPENSED
Start: 2020-08-31

## (undated) RX ORDER — CEFAZOLIN SODIUM 2 G/100ML
INJECTION, SOLUTION INTRAVENOUS
Status: DISPENSED
Start: 2020-08-31

## (undated) RX ORDER — HYDROMORPHONE HYDROCHLORIDE 1 MG/ML
INJECTION, SOLUTION INTRAMUSCULAR; INTRAVENOUS; SUBCUTANEOUS
Status: DISPENSED
Start: 2020-08-31

## (undated) RX ORDER — LIDOCAINE HYDROCHLORIDE 20 MG/ML
INJECTION, SOLUTION EPIDURAL; INFILTRATION; INTRACAUDAL; PERINEURAL
Status: DISPENSED
Start: 2020-08-31

## (undated) RX ORDER — ONDANSETRON 2 MG/ML
INJECTION INTRAMUSCULAR; INTRAVENOUS
Status: DISPENSED
Start: 2020-08-31

## (undated) RX ORDER — TRANEXAMIC ACID 650 MG/1
TABLET ORAL
Status: DISPENSED
Start: 2020-08-31

## (undated) RX ORDER — FENTANYL CITRATE 50 UG/ML
INJECTION, SOLUTION INTRAMUSCULAR; INTRAVENOUS
Status: DISPENSED
Start: 2020-08-31

## (undated) RX ORDER — DEXAMETHASONE SODIUM PHOSPHATE 4 MG/ML
INJECTION, SOLUTION INTRA-ARTICULAR; INTRALESIONAL; INTRAMUSCULAR; INTRAVENOUS; SOFT TISSUE
Status: DISPENSED
Start: 2020-08-31

## (undated) RX ORDER — CEFAZOLIN SODIUM 1 G/3ML
INJECTION, POWDER, FOR SOLUTION INTRAMUSCULAR; INTRAVENOUS
Status: DISPENSED
Start: 2020-08-31

## (undated) RX ORDER — VANCOMYCIN HYDROCHLORIDE 1 G/20ML
INJECTION, POWDER, LYOPHILIZED, FOR SOLUTION INTRAVENOUS
Status: DISPENSED
Start: 2020-08-31

## (undated) RX ORDER — OXYCODONE HCL 10 MG/1
TABLET, FILM COATED, EXTENDED RELEASE ORAL
Status: DISPENSED
Start: 2020-08-31